# Patient Record
Sex: MALE | NOT HISPANIC OR LATINO | Employment: FULL TIME | URBAN - METROPOLITAN AREA
[De-identification: names, ages, dates, MRNs, and addresses within clinical notes are randomized per-mention and may not be internally consistent; named-entity substitution may affect disease eponyms.]

---

## 2023-10-31 ENCOUNTER — LAB (OUTPATIENT)
Dept: LAB | Facility: LAB | Age: 53
End: 2023-10-31
Payer: COMMERCIAL

## 2023-10-31 ENCOUNTER — OFFICE VISIT (OUTPATIENT)
Dept: PRIMARY CARE | Facility: CLINIC | Age: 53
End: 2023-10-31
Payer: COMMERCIAL

## 2023-10-31 VITALS
HEIGHT: 71 IN | WEIGHT: 207 LBS | TEMPERATURE: 98.6 F | OXYGEN SATURATION: 96 % | DIASTOLIC BLOOD PRESSURE: 89 MMHG | HEART RATE: 90 BPM | SYSTOLIC BLOOD PRESSURE: 157 MMHG | BODY MASS INDEX: 28.98 KG/M2

## 2023-10-31 DIAGNOSIS — E78.5 DYSLIPIDEMIA: ICD-10-CM

## 2023-10-31 DIAGNOSIS — Z00.00 ROUTINE GENERAL MEDICAL EXAMINATION AT A HEALTH CARE FACILITY: ICD-10-CM

## 2023-10-31 DIAGNOSIS — I10 HYPERTENSION, UNSPECIFIED TYPE: ICD-10-CM

## 2023-10-31 DIAGNOSIS — E78.5 DYSLIPIDEMIA: Primary | ICD-10-CM

## 2023-10-31 LAB
ALBUMIN SERPL BCP-MCNC: 4.4 G/DL (ref 3.4–5)
ALP SERPL-CCNC: 99 U/L (ref 33–120)
ALT SERPL W P-5'-P-CCNC: 14 U/L (ref 10–52)
ANION GAP SERPL CALC-SCNC: 15 MMOL/L (ref 10–20)
AST SERPL W P-5'-P-CCNC: 13 U/L (ref 9–39)
BASOPHILS # BLD AUTO: 0.08 X10*3/UL (ref 0–0.1)
BASOPHILS NFR BLD AUTO: 0.7 %
BILIRUB SERPL-MCNC: 0.5 MG/DL (ref 0–1.2)
BUN SERPL-MCNC: 12 MG/DL (ref 6–23)
CALCIUM SERPL-MCNC: 9.9 MG/DL (ref 8.6–10.6)
CHLORIDE SERPL-SCNC: 102 MMOL/L (ref 98–107)
CHOLEST SERPL-MCNC: 174 MG/DL (ref 0–199)
CHOLESTEROL/HDL RATIO: 3.5
CO2 SERPL-SCNC: 26 MMOL/L (ref 21–32)
CREAT SERPL-MCNC: 1.02 MG/DL (ref 0.5–1.3)
EOSINOPHIL # BLD AUTO: 0.34 X10*3/UL (ref 0–0.7)
EOSINOPHIL NFR BLD AUTO: 3 %
ERYTHROCYTE [DISTWIDTH] IN BLOOD BY AUTOMATED COUNT: 21.3 % (ref 11.5–14.5)
GFR SERPL CREATININE-BSD FRML MDRD: 88 ML/MIN/1.73M*2
GLUCOSE SERPL-MCNC: 98 MG/DL (ref 74–99)
HCT VFR BLD AUTO: 29.5 % (ref 41–52)
HDLC SERPL-MCNC: 49.9 MG/DL
HGB BLD-MCNC: 6.9 G/DL (ref 13.5–17.5)
HYPOCHROMIA BLD QL SMEAR: NORMAL
IMM GRANULOCYTES # BLD AUTO: 0.03 X10*3/UL (ref 0–0.7)
IMM GRANULOCYTES NFR BLD AUTO: 0.3 % (ref 0–0.9)
LDLC SERPL CALC-MCNC: 94 MG/DL
LYMPHOCYTES # BLD AUTO: 2.28 X10*3/UL (ref 1.2–4.8)
LYMPHOCYTES NFR BLD AUTO: 20.1 %
MCH RBC QN AUTO: 13.6 PG (ref 26–34)
MCHC RBC AUTO-ENTMCNC: 23.4 G/DL (ref 32–36)
MCV RBC AUTO: 58 FL (ref 80–100)
MONOCYTES # BLD AUTO: 1.01 X10*3/UL (ref 0.1–1)
MONOCYTES NFR BLD AUTO: 8.9 %
NEUTROPHILS # BLD AUTO: 7.59 X10*3/UL (ref 1.2–7.7)
NEUTROPHILS NFR BLD AUTO: 67 %
NON HDL CHOLESTEROL: 124 MG/DL (ref 0–149)
NRBC BLD-RTO: 0 /100 WBCS (ref 0–0)
OVALOCYTES BLD QL SMEAR: NORMAL
PLATELET # BLD AUTO: 592 X10*3/UL (ref 150–450)
PMV BLD AUTO: 9.9 FL (ref 7.5–11.5)
POLYCHROMASIA BLD QL SMEAR: NORMAL
POTASSIUM SERPL-SCNC: 4.6 MMOL/L (ref 3.5–5.3)
PROT SERPL-MCNC: 7.7 G/DL (ref 6.4–8.2)
PSA SERPL-MCNC: 1.09 NG/ML
RBC # BLD AUTO: 5.07 X10*6/UL (ref 4.5–5.9)
RBC MORPH BLD: NORMAL
SCHISTOCYTES BLD QL SMEAR: NORMAL
SODIUM SERPL-SCNC: 138 MMOL/L (ref 136–145)
TRIGL SERPL-MCNC: 150 MG/DL (ref 0–149)
VLDL: 30 MG/DL (ref 0–40)
WBC # BLD AUTO: 11.3 X10*3/UL (ref 4.4–11.3)

## 2023-10-31 PROCEDURE — 80061 LIPID PANEL: CPT

## 2023-10-31 PROCEDURE — 99203 OFFICE O/P NEW LOW 30 MIN: CPT | Performed by: INTERNAL MEDICINE

## 2023-10-31 PROCEDURE — 80053 COMPREHEN METABOLIC PANEL: CPT

## 2023-10-31 PROCEDURE — 36415 COLL VENOUS BLD VENIPUNCTURE: CPT

## 2023-10-31 PROCEDURE — 3079F DIAST BP 80-89 MM HG: CPT | Performed by: INTERNAL MEDICINE

## 2023-10-31 PROCEDURE — 85025 COMPLETE CBC W/AUTO DIFF WBC: CPT

## 2023-10-31 PROCEDURE — 84153 ASSAY OF PSA TOTAL: CPT

## 2023-10-31 PROCEDURE — 1036F TOBACCO NON-USER: CPT | Performed by: INTERNAL MEDICINE

## 2023-10-31 PROCEDURE — 93000 ELECTROCARDIOGRAM COMPLETE: CPT | Performed by: INTERNAL MEDICINE

## 2023-10-31 PROCEDURE — 3077F SYST BP >= 140 MM HG: CPT | Performed by: INTERNAL MEDICINE

## 2023-10-31 RX ORDER — AMLODIPINE BESYLATE 10 MG/1
10 TABLET ORAL DAILY
Qty: 90 TABLET | Refills: 1 | Status: SHIPPED | OUTPATIENT
Start: 2023-10-31 | End: 2024-04-29 | Stop reason: SDUPTHER

## 2023-10-31 RX ORDER — ATORVASTATIN CALCIUM 20 MG/1
20 TABLET, FILM COATED ORAL DAILY
COMMUNITY
End: 2023-10-31 | Stop reason: SDUPTHER

## 2023-10-31 RX ORDER — ATORVASTATIN CALCIUM 20 MG/1
20 TABLET, FILM COATED ORAL DAILY
Qty: 90 TABLET | Refills: 1 | Status: SHIPPED | OUTPATIENT
Start: 2023-10-31 | End: 2024-10-30

## 2023-10-31 RX ORDER — TRIAMTERENE AND HYDROCHLOROTHIAZIDE 75; 50 MG/1; MG/1
1 TABLET ORAL DAILY
COMMUNITY
End: 2023-10-31 | Stop reason: SDUPTHER

## 2023-10-31 RX ORDER — AMLODIPINE BESYLATE 10 MG/1
10 TABLET ORAL DAILY
COMMUNITY
End: 2023-10-31 | Stop reason: SDUPTHER

## 2023-10-31 RX ORDER — TRIAMTERENE AND HYDROCHLOROTHIAZIDE 75; 50 MG/1; MG/1
1 TABLET ORAL DAILY
Qty: 90 TABLET | Refills: 1 | Status: SHIPPED | OUTPATIENT
Start: 2023-10-31 | End: 2024-01-18 | Stop reason: WASHOUT

## 2023-10-31 ASSESSMENT — LIFESTYLE VARIABLES
HOW OFTEN DO YOU HAVE A DRINK CONTAINING ALCOHOL: MONTHLY OR LESS
AUDIT-C TOTAL SCORE: 2
HOW MANY STANDARD DRINKS CONTAINING ALCOHOL DO YOU HAVE ON A TYPICAL DAY: 1 OR 2
HOW OFTEN DO YOU HAVE SIX OR MORE DRINKS ON ONE OCCASION: LESS THAN MONTHLY
SKIP TO QUESTIONS 9-10: 0

## 2023-10-31 ASSESSMENT — PATIENT HEALTH QUESTIONNAIRE - PHQ9
SUM OF ALL RESPONSES TO PHQ9 QUESTIONS 1 AND 2: 0
2. FEELING DOWN, DEPRESSED OR HOPELESS: NOT AT ALL
1. LITTLE INTEREST OR PLEASURE IN DOING THINGS: NOT AT ALL

## 2023-10-31 ASSESSMENT — COLUMBIA-SUICIDE SEVERITY RATING SCALE - C-SSRS: 1. IN THE PAST MONTH, HAVE YOU WISHED YOU WERE DEAD OR WISHED YOU COULD GO TO SLEEP AND NOT WAKE UP?: NO

## 2023-10-31 NOTE — PROGRESS NOTES
"Subjective   Patient ID: Eze Damian is a 52 y.o. male who presents for New Patient Visit.    HPI     Review of Systems    Objective   /89   Pulse 90   Temp 37 °C (98.6 °F)   Ht 1.803 m (5' 11\")   Wt 93.9 kg (207 lb)   SpO2 96%   BMI 28.87 kg/m²     Physical Exam    Assessment/Plan          "

## 2023-10-31 NOTE — PROGRESS NOTES
"Subjective   Patient ID: Eze Damian is a 52 y.o. male who presents for New Patient Visit.    HPI patient presents to clinic in order  to get established with the office.  He had been a former patient of Adeola DUKE CNP.  He is noticed to have elevated blood pressure of 157/89 because he ran out of his medication few months ago.  He is requesting a routine physical examination.  He is otherwise doing well and denies any cough, congestion, fever or chills shortness of breath and swelling of legs.  EKG done shows sinus arrhythmia at 93 bpm with possible left atrial enlargement without any ischemic changes.   Past medical history significant for hypertension, dyslipidemia and coronary atherosclerosis with a coronary artery calcium score of 91.39 done on 11/1/2021.    Review of Systems   Constitutional: Negative.    HENT: Negative.     Eyes: Negative.    Respiratory: Negative.     Cardiovascular: Negative.    Gastrointestinal: Negative.    Endocrine: Negative.    Genitourinary: Negative.    Musculoskeletal: Negative.    Skin: Negative.    Allergic/Immunologic: Negative.    Neurological: Negative.    Hematological: Negative.    Psychiatric/Behavioral: Negative.         Objective   /89   Pulse 90   Temp 37 °C (98.6 °F)   Ht 1.803 m (5' 11\")   Wt 93.9 kg (207 lb)   SpO2 96%   BMI 28.87 kg/m²     Physical Exam  Constitutional:       Appearance: Normal appearance. He is normal weight.   HENT:      Right Ear: Tympanic membrane normal.      Left Ear: Tympanic membrane and ear canal normal.      Nose: Nose normal.   Neck:      Vascular: No carotid bruit.   Cardiovascular:      Rate and Rhythm: Normal rate.   Pulmonary:      Effort: No respiratory distress.      Breath sounds: No stridor. No wheezing.   Abdominal:      Palpations: Abdomen is soft.      Tenderness: There is no abdominal tenderness. There is no guarding or rebound.   Skin:     Coloration: Skin is not jaundiced.   Neurological:      General: No " focal deficit present.      Mental Status: He is alert and oriented to person, place, and time.   Psychiatric:         Mood and Affect: Mood normal.         Assessment/Plan    patient is given a refill on his routine medication.  He will be scheduled for routine blood work.  He is also referred to gastroenterology clinic for colonoscopy regarding colon cancer screening.  He will return to clinic in 2 weeks for follow-up visit.

## 2023-11-01 ENCOUNTER — TELEPHONE (OUTPATIENT)
Dept: PRIMARY CARE | Facility: CLINIC | Age: 53
End: 2023-11-01
Payer: COMMERCIAL

## 2023-11-02 ENCOUNTER — OFFICE VISIT (OUTPATIENT)
Dept: PRIMARY CARE | Facility: CLINIC | Age: 53
End: 2023-11-02
Payer: COMMERCIAL

## 2023-11-02 VITALS
SYSTOLIC BLOOD PRESSURE: 158 MMHG | OXYGEN SATURATION: 98 % | DIASTOLIC BLOOD PRESSURE: 80 MMHG | HEIGHT: 71 IN | HEART RATE: 120 BPM | BODY MASS INDEX: 28.98 KG/M2 | WEIGHT: 207 LBS

## 2023-11-02 DIAGNOSIS — I10 HYPERTENSION, UNSPECIFIED TYPE: ICD-10-CM

## 2023-11-02 DIAGNOSIS — D50.9 MICROCYTIC ANEMIA: Primary | ICD-10-CM

## 2023-11-02 PROCEDURE — 1036F TOBACCO NON-USER: CPT | Performed by: INTERNAL MEDICINE

## 2023-11-02 PROCEDURE — 3077F SYST BP >= 140 MM HG: CPT | Performed by: INTERNAL MEDICINE

## 2023-11-02 PROCEDURE — 3079F DIAST BP 80-89 MM HG: CPT | Performed by: INTERNAL MEDICINE

## 2023-11-02 PROCEDURE — 99213 OFFICE O/P EST LOW 20 MIN: CPT | Performed by: INTERNAL MEDICINE

## 2023-11-02 RX ORDER — IRON POLYSACCHARIDE COMPLEX 150 MG
150 CAPSULE ORAL DAILY
Qty: 30 CAPSULE | Refills: 0 | Status: SHIPPED | OUTPATIENT
Start: 2023-11-02 | End: 2023-12-02

## 2023-11-02 RX ORDER — PANTOPRAZOLE SODIUM 40 MG/1
40 TABLET, DELAYED RELEASE ORAL DAILY
Qty: 30 TABLET | Refills: 0 | Status: SHIPPED | OUTPATIENT
Start: 2023-11-02 | End: 2023-12-04 | Stop reason: SDUPTHER

## 2023-11-02 ASSESSMENT — PATIENT HEALTH QUESTIONNAIRE - PHQ9
1. LITTLE INTEREST OR PLEASURE IN DOING THINGS: NOT AT ALL
2. FEELING DOWN, DEPRESSED OR HOPELESS: NOT AT ALL
SUM OF ALL RESPONSES TO PHQ9 QUESTIONS 1 AND 2: 0

## 2023-11-02 ASSESSMENT — COLUMBIA-SUICIDE SEVERITY RATING SCALE - C-SSRS: 1. IN THE PAST MONTH, HAVE YOU WISHED YOU WERE DEAD OR WISHED YOU COULD GO TO SLEEP AND NOT WAKE UP?: NO

## 2023-11-02 NOTE — PROGRESS NOTES
"Subjective   Patient ID: Eze Damian is a 52 y.o. male who presents for Follow-up.    HPI     Review of Systems    Objective   /80   Pulse (!) 120   Ht 1.803 m (5' 11\")   Wt 93.9 kg (207 lb)   SpO2 98%   BMI 28.87 kg/m²     Physical Exam    Assessment/Plan          "

## 2023-11-02 NOTE — PROGRESS NOTES
"Subjective   Patient ID: Eze Damian is a 52 y.o. male who presents for Follow-up.    HPI patient was requested to come to the clinic to discuss abnormal test results.  He is doing well and denies any abdominal pain, nausea vomiting, GI bleeding, jaundice, swelling of legs, fever, weight loss and hematuria.  Laboratory studies done shows hemoglobin of 6.9, hematocrit of 29.5 MCV of 58 MCH 13.6 MCHC of 23.4 with platelets of 592, serum glucose of 98 and other studies have been reviewed and discussed with him.  He has history of hypertension, dyslipidemia and coronary atherosclerosis with a coronary artery calcium score of 91.39 done on 11/1/2021.     Review of Systems   Constitutional: Negative.    HENT: Negative.     Eyes: Negative.    Respiratory: Negative.     Cardiovascular: Negative.    Gastrointestinal: Negative.    Endocrine: Negative.    Genitourinary: Negative.    Musculoskeletal: Negative.    Skin: Negative.    Allergic/Immunologic: Negative.    Neurological: Negative.    Hematological: Negative.    Psychiatric/Behavioral: Negative.         Objective   /80   Pulse (!) 120   Ht 1.803 m (5' 11\")   Wt 93.9 kg (207 lb)   SpO2 98%   BMI 28.87 kg/m²     Physical Exam  Constitutional:       Appearance: Normal appearance. He is normal weight.   HENT:      Right Ear: Tympanic membrane normal.      Left Ear: Tympanic membrane and ear canal normal.      Nose: Nose normal.   Neck:      Vascular: No carotid bruit.   Cardiovascular:      Rate and Rhythm: Normal rate.   Pulmonary:      Effort: No respiratory distress.      Breath sounds: No stridor. No wheezing.   Abdominal:      Palpations: Abdomen is soft.      Tenderness: There is no guarding or rebound.   Skin:     Coloration: Skin is not jaundiced.   Neurological:      General: No focal deficit present.      Mental Status: He is alert and oriented to person, place, and time.   Psychiatric:         Mood and Affect: Mood normal.         Assessment/Plan    " patient advised to avoid any NSAIDs or any alcohol use.  He is given trial with iron tablet and pantoprazole.  I will reach out to Dr. Fischer  personally and try to get him an earlier appointment for EGD and colonoscopy in view of severe anemia.  He will continue other medications and will return to clinic in 1 month for follow-up visit.

## 2023-11-03 ASSESSMENT — ENCOUNTER SYMPTOMS
HEMATOLOGIC/LYMPHATIC NEGATIVE: 1
CONSTITUTIONAL NEGATIVE: 1
PSYCHIATRIC NEGATIVE: 1
MUSCULOSKELETAL NEGATIVE: 1
GASTROINTESTINAL NEGATIVE: 1
RESPIRATORY NEGATIVE: 1
CARDIOVASCULAR NEGATIVE: 1
ENDOCRINE NEGATIVE: 1
NEUROLOGICAL NEGATIVE: 1
EYES NEGATIVE: 1
ALLERGIC/IMMUNOLOGIC NEGATIVE: 1

## 2023-11-05 ASSESSMENT — ENCOUNTER SYMPTOMS
PSYCHIATRIC NEGATIVE: 1
RESPIRATORY NEGATIVE: 1
GASTROINTESTINAL NEGATIVE: 1
NEUROLOGICAL NEGATIVE: 1
HEMATOLOGIC/LYMPHATIC NEGATIVE: 1
EYES NEGATIVE: 1
MUSCULOSKELETAL NEGATIVE: 1
CARDIOVASCULAR NEGATIVE: 1
ALLERGIC/IMMUNOLOGIC NEGATIVE: 1
CONSTITUTIONAL NEGATIVE: 1
ENDOCRINE NEGATIVE: 1

## 2023-11-14 DIAGNOSIS — D50.9 IRON DEFICIENCY ANEMIA, UNSPECIFIED IRON DEFICIENCY ANEMIA TYPE: ICD-10-CM

## 2023-11-14 RX ORDER — POLYETHYLENE GLYCOL 3350, SODIUM SULFATE ANHYDROUS, SODIUM BICARBONATE, SODIUM CHLORIDE, POTASSIUM CHLORIDE 236; 22.74; 6.74; 5.86; 2.97 G/4L; G/4L; G/4L; G/4L; G/4L
4000 POWDER, FOR SOLUTION ORAL ONCE
Qty: 4000 ML | Refills: 0 | Status: SHIPPED | OUTPATIENT
Start: 2023-11-14 | End: 2023-11-14

## 2023-11-17 ENCOUNTER — LAB (OUTPATIENT)
Dept: LAB | Facility: LAB | Age: 53
End: 2023-11-17
Payer: COMMERCIAL

## 2023-11-17 DIAGNOSIS — D50.9 MICROCYTIC ANEMIA: ICD-10-CM

## 2023-11-17 LAB
BASOPHILS # BLD AUTO: 0.08 X10*3/UL (ref 0–0.1)
BASOPHILS NFR BLD AUTO: 0.6 %
EOSINOPHIL # BLD AUTO: 0.32 X10*3/UL (ref 0–0.7)
EOSINOPHIL NFR BLD AUTO: 2.4 %
ERYTHROCYTE [DISTWIDTH] IN BLOOD BY AUTOMATED COUNT: 22 % (ref 11.5–14.5)
FERRITIN SERPL-MCNC: 15 NG/ML (ref 20–300)
HCT VFR BLD AUTO: 31.5 % (ref 41–52)
HGB BLD-MCNC: 7.6 G/DL (ref 13.5–17.5)
HGB RETIC QN: 14 PG (ref 28–38)
HYPOCHROMIA BLD QL SMEAR: NORMAL
IMM GRANULOCYTES # BLD AUTO: 0.05 X10*3/UL (ref 0–0.7)
IMM GRANULOCYTES NFR BLD AUTO: 0.4 % (ref 0–0.9)
IMMATURE RETIC FRACTION: 29.4 %
LYMPHOCYTES # BLD AUTO: 1.94 X10*3/UL (ref 1.2–4.8)
LYMPHOCYTES NFR BLD AUTO: 14.6 %
MCH RBC QN AUTO: 14.4 PG (ref 26–34)
MCHC RBC AUTO-ENTMCNC: 24.1 G/DL (ref 32–36)
MCV RBC AUTO: 60 FL (ref 80–100)
MONOCYTES # BLD AUTO: 1.14 X10*3/UL (ref 0.1–1)
MONOCYTES NFR BLD AUTO: 8.6 %
NEUTROPHILS # BLD AUTO: 9.74 X10*3/UL (ref 1.2–7.7)
NEUTROPHILS NFR BLD AUTO: 73.4 %
NRBC BLD-RTO: 0 /100 WBCS (ref 0–0)
OVALOCYTES BLD QL SMEAR: NORMAL
PLATELET # BLD AUTO: 1005 X10*3/UL (ref 150–450)
RBC # BLD AUTO: 5.28 X10*6/UL (ref 4.5–5.9)
RBC MORPH BLD: NORMAL
RETICS #: 0.11 X10*6/UL (ref 0.02–0.12)
RETICS/RBC NFR AUTO: 2.1 % (ref 0.5–2)
WBC # BLD AUTO: 13.3 X10*3/UL (ref 4.4–11.3)

## 2023-11-17 PROCEDURE — 85025 COMPLETE CBC W/AUTO DIFF WBC: CPT

## 2023-11-17 PROCEDURE — 82728 ASSAY OF FERRITIN: CPT

## 2023-11-17 PROCEDURE — 85045 AUTOMATED RETICULOCYTE COUNT: CPT

## 2023-11-17 PROCEDURE — 36415 COLL VENOUS BLD VENIPUNCTURE: CPT

## 2023-11-20 ENCOUNTER — APPOINTMENT (OUTPATIENT)
Dept: PRIMARY CARE | Facility: CLINIC | Age: 53
End: 2023-11-20
Payer: COMMERCIAL

## 2023-11-20 ENCOUNTER — TELEMEDICINE (OUTPATIENT)
Dept: PRIMARY CARE | Facility: CLINIC | Age: 53
End: 2023-11-20
Payer: COMMERCIAL

## 2023-11-20 ENCOUNTER — TELEPHONE (OUTPATIENT)
Dept: PRIMARY CARE | Facility: CLINIC | Age: 53
End: 2023-11-20

## 2023-11-20 DIAGNOSIS — D75.839 THROMBOCYTOSIS: Primary | ICD-10-CM

## 2023-11-20 DIAGNOSIS — D50.9 MICROCYTIC ANEMIA: ICD-10-CM

## 2023-11-20 PROCEDURE — 99442 PR PHYS/QHP TELEPHONE EVALUATION 11-20 MIN: CPT | Performed by: INTERNAL MEDICINE

## 2023-11-20 NOTE — PROGRESS NOTES
Subjective   Patient ID: Eze Damian is a 53 y.o. male who presents for Follow-up.    HPI patient is attended by phone visit to discuss abnormal test result.  CBC done shows hemoglobin of 7.6 MCV of 60 and platelet count of more than 1 million.  He is otherwise asymptomatic.  He denies any swelling of the limb, nausea, vomiting, abdominal pain and any GI bleeding.  He is still awaiting to have colonoscopy done. He has history of hypertension, dyslipidemia and coronary atherosclerosis with a coronary artery calcium score of 91.39 done on 11/1/2021.      Review of Systems   Constitutional: Negative.    HENT: Negative.     Eyes: Negative.    Respiratory: Negative.     Cardiovascular: Negative.    Gastrointestinal: Negative.    Endocrine: Negative.    Genitourinary: Negative.    Musculoskeletal: Negative.    Skin: Negative.    Allergic/Immunologic: Negative.    Neurological: Negative.    Hematological: Negative.    Psychiatric/Behavioral: Negative.         Objective   There were no vitals taken for this visit.    Physical Exam not done     Assessment/Plan    patient advised to stop taking iron tablet.  He will be referred to hematology oncology regarding microcytic anemia and thrombocytosis.  He will also follow-up with gastroenterology regarding upper and lower endoscopy.  He will return to clinic in 2 months for follow-up visit.

## 2023-11-22 ASSESSMENT — ENCOUNTER SYMPTOMS
MUSCULOSKELETAL NEGATIVE: 1
RESPIRATORY NEGATIVE: 1
EYES NEGATIVE: 1
ENDOCRINE NEGATIVE: 1
ALLERGIC/IMMUNOLOGIC NEGATIVE: 1
HEMATOLOGIC/LYMPHATIC NEGATIVE: 1
NEUROLOGICAL NEGATIVE: 1
CARDIOVASCULAR NEGATIVE: 1
PSYCHIATRIC NEGATIVE: 1
CONSTITUTIONAL NEGATIVE: 1
GASTROINTESTINAL NEGATIVE: 1

## 2023-12-01 ENCOUNTER — APPOINTMENT (OUTPATIENT)
Dept: GASTROENTEROLOGY | Facility: EXTERNAL LOCATION | Age: 53
End: 2023-12-01
Payer: COMMERCIAL

## 2023-12-04 DIAGNOSIS — D50.9 MICROCYTIC ANEMIA: ICD-10-CM

## 2023-12-05 RX ORDER — PANTOPRAZOLE SODIUM 40 MG/1
40 TABLET, DELAYED RELEASE ORAL DAILY
Qty: 30 TABLET | Refills: 0 | Status: SHIPPED | OUTPATIENT
Start: 2023-12-05 | End: 2024-01-08 | Stop reason: SDUPTHER

## 2023-12-09 ENCOUNTER — OFFICE VISIT (OUTPATIENT)
Dept: GASTROENTEROLOGY | Facility: EXTERNAL LOCATION | Age: 53
End: 2023-12-09
Payer: COMMERCIAL

## 2023-12-09 DIAGNOSIS — K63.89 COLONIC MASS: Primary | ICD-10-CM

## 2023-12-09 DIAGNOSIS — K64.8 OTHER HEMORRHOIDS: ICD-10-CM

## 2023-12-09 DIAGNOSIS — D50.9 IRON DEFICIENCY ANEMIA, UNSPECIFIED IRON DEFICIENCY ANEMIA TYPE: ICD-10-CM

## 2023-12-09 DIAGNOSIS — D49.0 NEOPLASM OF UNSPECIFIED BEHAVIOR OF DIGESTIVE SYSTEM: ICD-10-CM

## 2023-12-09 DIAGNOSIS — D12.3 BENIGN NEOPLASM OF TRANSVERSE COLON: ICD-10-CM

## 2023-12-09 DIAGNOSIS — D50.9 IRON DEFICIENCY ANEMIA, UNSPECIFIED: ICD-10-CM

## 2023-12-09 DIAGNOSIS — Z12.11 ENCOUNTER FOR SCREENING FOR MALIGNANT NEOPLASM OF COLON: ICD-10-CM

## 2023-12-09 DIAGNOSIS — D64.9 ANEMIA, UNSPECIFIED TYPE: ICD-10-CM

## 2023-12-09 PROCEDURE — 88342 IMHCHEM/IMCYTCHM 1ST ANTB: CPT | Performed by: PATHOLOGY

## 2023-12-09 PROCEDURE — 88341 IMHCHEM/IMCYTCHM EA ADD ANTB: CPT

## 2023-12-09 PROCEDURE — 45381 COLONOSCOPY SUBMUCOUS NJX: CPT | Performed by: INTERNAL MEDICINE

## 2023-12-09 PROCEDURE — 45385 COLONOSCOPY W/LESION REMOVAL: CPT | Performed by: INTERNAL MEDICINE

## 2023-12-09 PROCEDURE — 88305 TISSUE EXAM BY PATHOLOGIST: CPT

## 2023-12-09 PROCEDURE — 1036F TOBACCO NON-USER: CPT | Performed by: INTERNAL MEDICINE

## 2023-12-09 PROCEDURE — 88341 IMHCHEM/IMCYTCHM EA ADD ANTB: CPT | Performed by: PATHOLOGY

## 2023-12-09 PROCEDURE — 88342 IMHCHEM/IMCYTCHM 1ST ANTB: CPT

## 2023-12-09 PROCEDURE — 45380 COLONOSCOPY AND BIOPSY: CPT | Performed by: INTERNAL MEDICINE

## 2023-12-09 PROCEDURE — 88305 TISSUE EXAM BY PATHOLOGIST: CPT | Performed by: PATHOLOGY

## 2023-12-09 PROCEDURE — 43239 EGD BIOPSY SINGLE/MULTIPLE: CPT | Performed by: INTERNAL MEDICINE

## 2023-12-13 ENCOUNTER — LAB REQUISITION (OUTPATIENT)
Dept: LAB | Facility: HOSPITAL | Age: 53
End: 2023-12-13
Payer: COMMERCIAL

## 2023-12-15 PROBLEM — E78.5 HYPERLIPIDEMIA: Status: ACTIVE | Noted: 2023-12-15

## 2023-12-15 PROBLEM — U07.1 COVID-19 VIRUS DETECTED: Status: ACTIVE | Noted: 2023-12-15

## 2023-12-15 PROBLEM — I10 HIGH BLOOD PRESSURE: Status: ACTIVE | Noted: 2023-12-15

## 2023-12-15 PROBLEM — M25.731 CARPAL BOSS OF RIGHT WRIST: Status: ACTIVE | Noted: 2023-12-15

## 2023-12-15 PROBLEM — R22.31 MASS OF RIGHT WRIST: Status: ACTIVE | Noted: 2023-12-15

## 2023-12-15 PROBLEM — K21.9 GASTROESOPHAGEAL REFLUX DISEASE: Status: ACTIVE | Noted: 2023-12-15

## 2023-12-15 RX ORDER — POLYETHYLENE GLYCOL-3350 AND ELECTROLYTES 236; 6.74; 5.86; 2.97; 22.74 G/274.31G; G/274.31G; G/274.31G; G/274.31G; G/274.31G
POWDER, FOR SOLUTION ORAL
Status: ON HOLD | COMMUNITY
Start: 2023-11-14 | End: 2024-01-29 | Stop reason: ALTCHOICE

## 2023-12-18 NOTE — H&P (VIEW-ONLY)
History Of Present Illness  Eze Damian is a 53 y.o. male referred by Dr. Fredi Russo for evaluation of colon cancer.    He was seen by new PCP in Mid-November. He was found to be anemic. HGB 7.6 g/dL. He was started on iron complex and scheduled for colonoscopy.  1st colonoscopy.    He started  feeling fatigued in early November. No unintentional weight loss. Denies any rectal bleeding.  No abdominal pain or changed in bowel habits.  Occasional right sided back pain     Has a BM every other day. Denies diarrhea/constipation.    His Hb is 6.3    12/09/2023  Colonoscopy to TI  The perianal and digital rectal exams were normal.    The terminal ileum appeared normal.  An infiltrative, polypoid and ulcerated non-obstructing large mass was found at the hepatic flexure.  The mass was partially circumferential (involving one third of the lumen circumference).  Biopsies were taken with cold forceps for histology.  Areas of a few folds proximal to the mass and the associated proximal large polyp and a few folds distal to the mass and the associated distal large polyp were tattooed with an injection of 3 ml of Spot (carbon black) total.  Two semi-pedunculated polyps were found in the hepatic flexure.  The polyps were large in size.  One of these polyps was just proximal to the mass and the other polyp was just distal to the mass.  No resection was attempted given the location in close proximity to the mass that would involve the same surgical field.  Three sessile polyps were found in the transverse colon.  The polyps were  5 to 9 mm in size. These polyps were removed with a cold snare.  Resection and retrieval were complete.    External and internal hemorrhoids were found during retroflexion. The hemorrhoids were small.  The exam was otherwise without abnormality on direct and retroflexion views.    12/09/2023 EGD  The examined esophagus was normal.  The Z line was regular and was found 41 cm from the incisors.  The  Utilizing patient's CPAP mask and tubing entire examined stomach was normal.  The duodenal bulb, second portion of the duodenum and third portion of the duodenum were normal.  Biopsies for histology were taken with cold forceps for evaluation of celiac disease.     Pathology  A.  Duodenum, biopsies:  - Small intestinal mucosa, no significant histopathological abnormalities.     B.  Transverse colon, polypectomies:  - Fragments of tubular adenoma.     C.  Hepatic flexure of colon, biopsy:  - Moderately differentiated adenocarcinoma of colon.    LABORATORY:  CEA 1.7    1/11/24 CT CHEST/ABD/PELVIS WITH CONTRAST:     Past Medical History  Colon cancer  HTN  HLD    Surgical History  1992 left knee surgery     Social History  Smoking: Denies. Occas chews tobacco  ETOH: socially   No recreation drugs  Works at the [employer]    Family History  Father unknown history  PGF - CAD  Mom - COPD   MGM - asthma      Allergies  Patient has no known allergies.    Review of Systems  Constitutional: Negative for fever, chills, anorexia, weight loss, malaise     ENMT: Negative for nasal discharge, congestion, ear pain, mouth pain, throat pain     Respiratory: Negative for cough, hemoptysis, wheezing, shortness of breath     Cardiac: Negative for chest pain, dyspnea on exertion, orthopnea, palpitations, syncope, (+)HTN, (+)HLD     Gastrointestinal: Negative for nausea, vomiting, diarrhea, constipation, abdominal pain, (+)COLON CANCER    Genitourinary: Negative for discharge, dysuria, flank pain, frequency, hematuria     Musculoskeletal: Negative for decreased ROM, pain, swelling, weakness     Neurological: Negative for dizziness, confusion, headache, seizures, syncope     Psychiatric: Negative for mood changes, anxiety, hallucinations, sleep changes, suicidal ideas     Skin: Negative for mass, pain, itching, rash, ulcer     Endocrine: Negative for heat intolerance, cold intolerance, excessive sweating, polyuria, excess thirst    Hematologic/Lymph: Negative for bruising, easy  bleeding, night sweats, petechiae, history of DVT/PE or cancer. +Anemia    Allergic/Immunologic: Negative for anaphylaxis, itchy/ teary eyes, itching, sneezing, swelling     Physical Exam:  Constitutional:       Appearance: Normal appearance. He is normal height.   Neck:      Vascular: No carotid bruit.   Cardiovascular:      Rate and Rhythm: Normal rate.   Pulmonary:      Effort: No respiratory distress.      Breath sounds: No stridor. No wheezing.   Abdominal:      Palpations: Abdomen is soft.      Tenderness: There is no guarding or rebound.   Skin:     Coloration: Skin is not jaundiced.   Neurological:      General: No focal deficit present.      Mental Status: He is alert and oriented to person, place, and time.   Psychiatric:         Mood and Affect: Mood normal.     Impression: Pt with hepatic flexure cancer   Severe anemia 6.3 last check     Plan:   Awaiting CT scans tomorrow  Repeat labs  Restart iron therapy  Check anemia labs - resume iron.  If <6 will send to ED For blood   If > 6 discussed iron containing foods and the importance of iron.   IF CT's negative then plan for right/extended right  based on tattoo location.     Discussed increased risk screening for 1st degree relatives

## 2023-12-18 NOTE — PROGRESS NOTES
History Of Present Illness  Eze Damian is a 53 y.o. male referred by Dr. Fredi Russo for evaluation of colon cancer.    He was seen by new PCP in Mid-November. He was found to be anemic. HGB 7.6 g/dL. He was started on iron complex and scheduled for colonoscopy.  1st colonoscopy.    He started  feeling fatigued in early November. No unintentional weight loss. Denies any rectal bleeding.  No abdominal pain or changed in bowel habits.  Occasional right sided back pain     Has a BM every other day. Denies diarrhea/constipation.    His Hb is 6.3    12/09/2023  Colonoscopy to TI  The perianal and digital rectal exams were normal.    The terminal ileum appeared normal.  An infiltrative, polypoid and ulcerated non-obstructing large mass was found at the hepatic flexure.  The mass was partially circumferential (involving one third of the lumen circumference).  Biopsies were taken with cold forceps for histology.  Areas of a few folds proximal to the mass and the associated proximal large polyp and a few folds distal to the mass and the associated distal large polyp were tattooed with an injection of 3 ml of Spot (carbon black) total.  Two semi-pedunculated polyps were found in the hepatic flexure.  The polyps were large in size.  One of these polyps was just proximal to the mass and the other polyp was just distal to the mass.  No resection was attempted given the location in close proximity to the mass that would involve the same surgical field.  Three sessile polyps were found in the transverse colon.  The polyps were  5 to 9 mm in size. These polyps were removed with a cold snare.  Resection and retrieval were complete.    External and internal hemorrhoids were found during retroflexion. The hemorrhoids were small.  The exam was otherwise without abnormality on direct and retroflexion views.    12/09/2023 EGD  The examined esophagus was normal.  The Z line was regular and was found 41 cm from the incisors.  The  entire examined stomach was normal.  The duodenal bulb, second portion of the duodenum and third portion of the duodenum were normal.  Biopsies for histology were taken with cold forceps for evaluation of celiac disease.     Pathology  A.  Duodenum, biopsies:  - Small intestinal mucosa, no significant histopathological abnormalities.     B.  Transverse colon, polypectomies:  - Fragments of tubular adenoma.     C.  Hepatic flexure of colon, biopsy:  - Moderately differentiated adenocarcinoma of colon.    LABORATORY:  CEA 1.7    1/11/24 CT CHEST/ABD/PELVIS WITH CONTRAST:     Past Medical History  Colon cancer  HTN  HLD    Surgical History  1992 left knee surgery     Social History  Smoking: Denies. Occas chews tobacco  ETOH: socially   No recreation drugs  Works at the [employer]    Family History  Father unknown history  PGF - CAD  Mom - COPD   MGM - asthma      Allergies  Patient has no known allergies.    Review of Systems  Constitutional: Negative for fever, chills, anorexia, weight loss, malaise     ENMT: Negative for nasal discharge, congestion, ear pain, mouth pain, throat pain     Respiratory: Negative for cough, hemoptysis, wheezing, shortness of breath     Cardiac: Negative for chest pain, dyspnea on exertion, orthopnea, palpitations, syncope, (+)HTN, (+)HLD     Gastrointestinal: Negative for nausea, vomiting, diarrhea, constipation, abdominal pain, (+)COLON CANCER    Genitourinary: Negative for discharge, dysuria, flank pain, frequency, hematuria     Musculoskeletal: Negative for decreased ROM, pain, swelling, weakness     Neurological: Negative for dizziness, confusion, headache, seizures, syncope     Psychiatric: Negative for mood changes, anxiety, hallucinations, sleep changes, suicidal ideas     Skin: Negative for mass, pain, itching, rash, ulcer     Endocrine: Negative for heat intolerance, cold intolerance, excessive sweating, polyuria, excess thirst    Hematologic/Lymph: Negative for bruising, easy  bleeding, night sweats, petechiae, history of DVT/PE or cancer. +Anemia    Allergic/Immunologic: Negative for anaphylaxis, itchy/ teary eyes, itching, sneezing, swelling     Physical Exam:  Constitutional:       Appearance: Normal appearance. He is normal height.   Neck:      Vascular: No carotid bruit.   Cardiovascular:      Rate and Rhythm: Normal rate.   Pulmonary:      Effort: No respiratory distress.      Breath sounds: No stridor. No wheezing.   Abdominal:      Palpations: Abdomen is soft.      Tenderness: There is no guarding or rebound.   Skin:     Coloration: Skin is not jaundiced.   Neurological:      General: No focal deficit present.      Mental Status: He is alert and oriented to person, place, and time.   Psychiatric:         Mood and Affect: Mood normal.     Impression: Pt with hepatic flexure cancer   Severe anemia 6.3 last check     Plan:   Awaiting CT scans tomorrow  Repeat labs  Restart iron therapy  Check anemia labs - resume iron.  If <6 will send to ED For blood   If > 6 discussed iron containing foods and the importance of iron.   IF CT's negative then plan for right/extended right  based on tattoo location.     Discussed increased risk screening for 1st degree relatives

## 2023-12-21 LAB
LAB AP ASR DISCLAIMER: NORMAL
LABORATORY COMMENT REPORT: NORMAL
PATH REPORT.ADDENDUM SPEC: NORMAL
PATH REPORT.COMMENTS IMP SPEC: NORMAL
PATH REPORT.FINAL DX SPEC: NORMAL
PATH REPORT.GROSS SPEC: NORMAL
PATH REPORT.RELEVANT HX SPEC: NORMAL
PATH REPORT.TOTAL CANCER: NORMAL

## 2024-01-03 ENCOUNTER — APPOINTMENT (OUTPATIENT)
Dept: SURGERY | Facility: CLINIC | Age: 54
End: 2024-01-03
Payer: COMMERCIAL

## 2024-01-04 ENCOUNTER — APPOINTMENT (OUTPATIENT)
Dept: RADIOLOGY | Facility: CLINIC | Age: 54
End: 2024-01-04
Payer: COMMERCIAL

## 2024-01-04 ENCOUNTER — LAB (OUTPATIENT)
Dept: LAB | Facility: LAB | Age: 54
End: 2024-01-04
Payer: COMMERCIAL

## 2024-01-04 DIAGNOSIS — D50.9 MICROCYTIC ANEMIA: ICD-10-CM

## 2024-01-04 DIAGNOSIS — D75.839 THROMBOCYTOSIS: ICD-10-CM

## 2024-01-04 DIAGNOSIS — K63.89 COLONIC MASS: ICD-10-CM

## 2024-01-04 PROCEDURE — 82565 ASSAY OF CREATININE: CPT

## 2024-01-04 PROCEDURE — 36415 COLL VENOUS BLD VENIPUNCTURE: CPT

## 2024-01-04 PROCEDURE — 82378 CARCINOEMBRYONIC ANTIGEN: CPT

## 2024-01-04 PROCEDURE — 85025 COMPLETE CBC W/AUTO DIFF WBC: CPT

## 2024-01-05 LAB
BASOPHILS # BLD AUTO: 0.07 X10*3/UL (ref 0–0.1)
BASOPHILS NFR BLD AUTO: 0.5 %
CEA SERPL-MCNC: 1.7 UG/L
CREAT SERPL-MCNC: 0.89 MG/DL (ref 0.5–1.3)
EOSINOPHIL # BLD AUTO: 0.45 X10*3/UL (ref 0–0.7)
EOSINOPHIL NFR BLD AUTO: 3.4 %
ERYTHROCYTE [DISTWIDTH] IN BLOOD BY AUTOMATED COUNT: 21.9 % (ref 11.5–14.5)
GFR SERPL CREATININE-BSD FRML MDRD: >90 ML/MIN/1.73M*2
HCT VFR BLD AUTO: 26.8 % (ref 41–52)
HGB BLD-MCNC: 6.3 G/DL (ref 13.5–17.5)
HYPOCHROMIA BLD QL SMEAR: NORMAL
IMM GRANULOCYTES # BLD AUTO: 0.06 X10*3/UL (ref 0–0.7)
IMM GRANULOCYTES NFR BLD AUTO: 0.5 % (ref 0–0.9)
LYMPHOCYTES # BLD AUTO: 2.39 X10*3/UL (ref 1.2–4.8)
LYMPHOCYTES NFR BLD AUTO: 18.1 %
MCH RBC QN AUTO: 13.9 PG (ref 26–34)
MCHC RBC AUTO-ENTMCNC: 23.5 G/DL (ref 32–36)
MCV RBC AUTO: 59 FL (ref 80–100)
MONOCYTES # BLD AUTO: 1 X10*3/UL (ref 0.1–1)
MONOCYTES NFR BLD AUTO: 7.6 %
NEUTROPHILS # BLD AUTO: 9.27 X10*3/UL (ref 1.2–7.7)
NEUTROPHILS NFR BLD AUTO: 69.9 %
NRBC BLD-RTO: 0 /100 WBCS (ref 0–0)
PLATELET # BLD AUTO: 927 X10*3/UL (ref 150–450)
POLYCHROMASIA BLD QL SMEAR: NORMAL
RBC # BLD AUTO: 4.52 X10*6/UL (ref 4.5–5.9)
RBC MORPH BLD: NORMAL
TARGETS BLD QL SMEAR: NORMAL
WBC # BLD AUTO: 13.2 X10*3/UL (ref 4.4–11.3)

## 2024-01-08 DIAGNOSIS — D50.9 MICROCYTIC ANEMIA: ICD-10-CM

## 2024-01-08 RX ORDER — PANTOPRAZOLE SODIUM 40 MG/1
40 TABLET, DELAYED RELEASE ORAL DAILY
Qty: 30 TABLET | Refills: 0 | Status: ON HOLD | OUTPATIENT
Start: 2024-01-08 | End: 2024-01-29 | Stop reason: WASHOUT

## 2024-01-10 ENCOUNTER — OFFICE VISIT (OUTPATIENT)
Dept: SURGERY | Facility: CLINIC | Age: 54
End: 2024-01-10
Payer: COMMERCIAL

## 2024-01-10 VITALS
HEIGHT: 71 IN | BODY MASS INDEX: 28.56 KG/M2 | TEMPERATURE: 98.8 F | HEART RATE: 72 BPM | DIASTOLIC BLOOD PRESSURE: 71 MMHG | WEIGHT: 204 LBS | SYSTOLIC BLOOD PRESSURE: 144 MMHG

## 2024-01-10 DIAGNOSIS — C18.3 MALIGNANT NEOPLASM OF HEPATIC FLEXURE (MULTI): Primary | ICD-10-CM

## 2024-01-10 PROCEDURE — 3077F SYST BP >= 140 MM HG: CPT | Performed by: COLON & RECTAL SURGERY

## 2024-01-10 PROCEDURE — 1036F TOBACCO NON-USER: CPT | Performed by: COLON & RECTAL SURGERY

## 2024-01-10 PROCEDURE — 99214 OFFICE O/P EST MOD 30 MIN: CPT | Performed by: COLON & RECTAL SURGERY

## 2024-01-10 PROCEDURE — 3078F DIAST BP <80 MM HG: CPT | Performed by: COLON & RECTAL SURGERY

## 2024-01-10 PROCEDURE — 99204 OFFICE O/P NEW MOD 45 MIN: CPT | Performed by: COLON & RECTAL SURGERY

## 2024-01-10 RX ORDER — FERROUS SULFATE 325(65) MG
650 TABLET, DELAYED RELEASE (ENTERIC COATED) ORAL
Qty: 60 TABLET | Refills: 11 | Status: SHIPPED | OUTPATIENT
Start: 2024-01-10 | End: 2024-04-25 | Stop reason: ALTCHOICE

## 2024-01-11 ENCOUNTER — ANCILLARY PROCEDURE (OUTPATIENT)
Dept: RADIOLOGY | Facility: CLINIC | Age: 54
End: 2024-01-11
Payer: COMMERCIAL

## 2024-01-11 ENCOUNTER — LAB (OUTPATIENT)
Dept: LAB | Facility: LAB | Age: 54
End: 2024-01-11
Payer: COMMERCIAL

## 2024-01-11 DIAGNOSIS — K63.89 COLONIC MASS: ICD-10-CM

## 2024-01-11 DIAGNOSIS — K63.89 COLONIC MASS: Primary | ICD-10-CM

## 2024-01-11 DIAGNOSIS — C18.3 MALIGNANT NEOPLASM OF HEPATIC FLEXURE (MULTI): ICD-10-CM

## 2024-01-11 LAB
ABO GROUP (TYPE) IN BLOOD: NORMAL
ANTIBODY SCREEN: NORMAL
BASOPHILS # BLD AUTO: 0.08 X10*3/UL (ref 0–0.1)
BASOPHILS NFR BLD AUTO: 0.6 %
BURR CELLS BLD QL SMEAR: NORMAL
DACRYOCYTES BLD QL SMEAR: NORMAL
EOSINOPHIL # BLD AUTO: 0.25 X10*3/UL (ref 0–0.7)
EOSINOPHIL NFR BLD AUTO: 1.8 %
ERYTHROCYTE [DISTWIDTH] IN BLOOD BY AUTOMATED COUNT: 21.7 % (ref 11.5–14.5)
HCT VFR BLD AUTO: 28 % (ref 41–52)
HGB BLD-MCNC: 6.7 G/DL (ref 13.5–17.5)
HYPOCHROMIA BLD QL SMEAR: NORMAL
IMM GRANULOCYTES # BLD AUTO: 0.05 X10*3/UL (ref 0–0.7)
IMM GRANULOCYTES NFR BLD AUTO: 0.4 % (ref 0–0.9)
IRON SATN MFR SERPL: ABNORMAL %
IRON SERPL-MCNC: <20 UG/DL (ref 45–160)
LYMPHOCYTES # BLD AUTO: 2.08 X10*3/UL (ref 1.2–4.8)
LYMPHOCYTES NFR BLD AUTO: 14.9 %
MCH RBC QN AUTO: 14.2 PG (ref 26–34)
MCHC RBC AUTO-ENTMCNC: 23.9 G/DL (ref 32–36)
MCV RBC AUTO: 59 FL (ref 80–100)
MONOCYTES # BLD AUTO: 1.26 X10*3/UL (ref 0.1–1)
MONOCYTES NFR BLD AUTO: 9 %
NEUTROPHILS # BLD AUTO: 10.23 X10*3/UL (ref 1.2–7.7)
NEUTROPHILS NFR BLD AUTO: 73.3 %
NRBC BLD-RTO: 0 /100 WBCS (ref 0–0)
OVALOCYTES BLD QL SMEAR: NORMAL
PLATELET # BLD AUTO: 853 X10*3/UL (ref 150–450)
POLYCHROMASIA BLD QL SMEAR: NORMAL
RBC # BLD AUTO: 4.72 X10*6/UL (ref 4.5–5.9)
RBC MORPH BLD: NORMAL
RH FACTOR (ANTIGEN D): NORMAL
SCHISTOCYTES BLD QL SMEAR: NORMAL
TARGETS BLD QL SMEAR: NORMAL
TIBC SERPL-MCNC: ABNORMAL UG/DL
UIBC SERPL-MCNC: 450 UG/DL (ref 110–370)
VIT B12 SERPL-MCNC: 188 PG/ML (ref 211–946)
WBC # BLD AUTO: 14 X10*3/UL (ref 4.4–11.3)

## 2024-01-11 PROCEDURE — 86900 BLOOD TYPING SEROLOGIC ABO: CPT

## 2024-01-11 PROCEDURE — 83540 ASSAY OF IRON: CPT

## 2024-01-11 PROCEDURE — 86901 BLOOD TYPING SEROLOGIC RH(D): CPT

## 2024-01-11 PROCEDURE — 82607 VITAMIN B-12: CPT

## 2024-01-11 PROCEDURE — 36415 COLL VENOUS BLD VENIPUNCTURE: CPT

## 2024-01-11 PROCEDURE — 74177 CT ABD & PELVIS W/CONTRAST: CPT

## 2024-01-11 PROCEDURE — 86850 RBC ANTIBODY SCREEN: CPT

## 2024-01-11 PROCEDURE — 83550 IRON BINDING TEST: CPT

## 2024-01-11 PROCEDURE — 85025 COMPLETE CBC W/AUTO DIFF WBC: CPT

## 2024-01-11 PROCEDURE — 2550000001 HC RX 255 CONTRASTS: Performed by: INTERNAL MEDICINE

## 2024-01-11 RX ADMIN — IOHEXOL 75 ML: 350 INJECTION, SOLUTION INTRAVENOUS at 14:24

## 2024-01-12 DIAGNOSIS — D50.0 IRON DEFICIENCY ANEMIA DUE TO CHRONIC BLOOD LOSS: Primary | ICD-10-CM

## 2024-01-12 RX ORDER — MAGNESIUM 200 MG
2000 TABLET ORAL DAILY
Qty: 60 TABLET | Refills: 1 | Status: SHIPPED | OUTPATIENT
Start: 2024-01-12 | End: 2024-03-12

## 2024-01-15 ENCOUNTER — APPOINTMENT (OUTPATIENT)
Dept: HEMATOLOGY/ONCOLOGY | Facility: CLINIC | Age: 54
End: 2024-01-15
Payer: COMMERCIAL

## 2024-01-15 PROBLEM — C18.3 MALIGNANT NEOPLASM OF HEPATIC FLEXURE (MULTI): Status: ACTIVE | Noted: 2024-01-10

## 2024-01-15 RX ORDER — HEPARIN SODIUM 5000 [USP'U]/ML
5000 INJECTION, SOLUTION INTRAVENOUS; SUBCUTANEOUS ONCE
Status: CANCELLED | OUTPATIENT
Start: 2024-01-15 | End: 2024-01-15

## 2024-01-15 RX ORDER — NEOMYCIN SULFATE 500 MG/1
TABLET ORAL
Qty: 6 TABLET | Refills: 0 | Status: ON HOLD | OUTPATIENT
Start: 2024-01-15 | End: 2024-01-29 | Stop reason: ALTCHOICE

## 2024-01-15 RX ORDER — GABAPENTIN 100 MG/1
CAPSULE ORAL
Qty: 3 CAPSULE | Refills: 0 | Status: ON HOLD | OUTPATIENT
Start: 2024-01-15 | End: 2024-01-29 | Stop reason: ALTCHOICE

## 2024-01-15 RX ORDER — METRONIDAZOLE 500 MG/100ML
500 INJECTION, SOLUTION INTRAVENOUS ONCE
Status: CANCELLED | OUTPATIENT
Start: 2024-01-15 | End: 2024-01-15

## 2024-01-15 RX ORDER — METRONIDAZOLE 250 MG/1
TABLET ORAL
Qty: 3 TABLET | Refills: 0 | Status: ON HOLD | OUTPATIENT
Start: 2024-01-15 | End: 2024-01-29 | Stop reason: ALTCHOICE

## 2024-01-18 ENCOUNTER — TELEMEDICINE CLINICAL SUPPORT (OUTPATIENT)
Dept: PREADMISSION TESTING | Facility: HOSPITAL | Age: 54
End: 2024-01-18
Payer: COMMERCIAL

## 2024-01-18 DIAGNOSIS — C18.3 MALIGNANT NEOPLASM OF HEPATIC FLEXURE (MULTI): ICD-10-CM

## 2024-01-23 ENCOUNTER — LAB (OUTPATIENT)
Dept: LAB | Facility: LAB | Age: 54
End: 2024-01-23
Payer: COMMERCIAL

## 2024-01-23 ENCOUNTER — PRE-ADMISSION TESTING (OUTPATIENT)
Dept: PREADMISSION TESTING | Facility: HOSPITAL | Age: 54
End: 2024-01-23
Payer: COMMERCIAL

## 2024-01-23 VITALS
OXYGEN SATURATION: 97 % | RESPIRATION RATE: 18 BRPM | WEIGHT: 201.5 LBS | SYSTOLIC BLOOD PRESSURE: 148 MMHG | DIASTOLIC BLOOD PRESSURE: 68 MMHG | TEMPERATURE: 99 F | BODY MASS INDEX: 28.21 KG/M2 | HEART RATE: 97 BPM | HEIGHT: 71 IN

## 2024-01-23 DIAGNOSIS — Z01.818 PREPROCEDURAL EXAMINATION: ICD-10-CM

## 2024-01-23 DIAGNOSIS — C18.3 MALIGNANT NEOPLASM OF HEPATIC FLEXURE (MULTI): ICD-10-CM

## 2024-01-23 DIAGNOSIS — C18.3 MALIGNANT NEOPLASM OF HEPATIC FLEXURE (MULTI): Primary | ICD-10-CM

## 2024-01-23 LAB
ABO GROUP (TYPE) IN BLOOD: NORMAL
ALBUMIN SERPL BCP-MCNC: 4.1 G/DL (ref 3.4–5)
ALP SERPL-CCNC: 110 U/L (ref 33–120)
ALT SERPL W P-5'-P-CCNC: 19 U/L (ref 10–52)
ANION GAP SERPL CALC-SCNC: 16 MMOL/L (ref 10–20)
ANTIBODY SCREEN: NORMAL
AST SERPL W P-5'-P-CCNC: 16 U/L (ref 9–39)
BASOPHILS # BLD AUTO: 0.12 X10*3/UL (ref 0–0.1)
BASOPHILS NFR BLD AUTO: 0.7 %
BILIRUB SERPL-MCNC: 0.5 MG/DL (ref 0–1.2)
BUN SERPL-MCNC: 18 MG/DL (ref 6–23)
CALCIUM SERPL-MCNC: 9.2 MG/DL (ref 8.6–10.3)
CHLORIDE SERPL-SCNC: 97 MMOL/L (ref 98–107)
CO2 SERPL-SCNC: 25 MMOL/L (ref 21–32)
CREAT SERPL-MCNC: 1.08 MG/DL (ref 0.5–1.3)
DACRYOCYTES BLD QL SMEAR: NORMAL
EGFRCR SERPLBLD CKD-EPI 2021: 82 ML/MIN/1.73M*2
EOSINOPHIL # BLD AUTO: 0.37 X10*3/UL (ref 0–0.7)
EOSINOPHIL NFR BLD AUTO: 2.1 %
ERYTHROCYTE [DISTWIDTH] IN BLOOD BY AUTOMATED COUNT: 28.3 % (ref 11.5–14.5)
GLUCOSE SERPL-MCNC: 159 MG/DL (ref 74–99)
HCT VFR BLD AUTO: 35.1 % (ref 41–52)
HGB BLD-MCNC: 8.7 G/DL (ref 13.5–17.5)
HYPOCHROMIA BLD QL SMEAR: NORMAL
IMM GRANULOCYTES # BLD AUTO: 0.1 X10*3/UL (ref 0–0.7)
IMM GRANULOCYTES NFR BLD AUTO: 0.6 % (ref 0–0.9)
LYMPHOCYTES # BLD AUTO: 2.37 X10*3/UL (ref 1.2–4.8)
LYMPHOCYTES NFR BLD AUTO: 13.2 %
MCH RBC QN AUTO: 15.6 PG (ref 26–34)
MCHC RBC AUTO-ENTMCNC: 24.8 G/DL (ref 32–36)
MCV RBC AUTO: 63 FL (ref 80–100)
MONOCYTES # BLD AUTO: 1.49 X10*3/UL (ref 0.1–1)
MONOCYTES NFR BLD AUTO: 8.3 %
NEUTROPHILS # BLD AUTO: 13.48 X10*3/UL (ref 1.2–7.7)
NEUTROPHILS NFR BLD AUTO: 75.1 %
NRBC BLD-RTO: 0 /100 WBCS (ref 0–0)
OVALOCYTES BLD QL SMEAR: NORMAL
PLATELET # BLD AUTO: 708 X10*3/UL (ref 150–450)
POLYCHROMASIA BLD QL SMEAR: NORMAL
POTASSIUM SERPL-SCNC: 4.9 MMOL/L (ref 3.5–5.3)
PROT SERPL-MCNC: 7.7 G/DL (ref 6.4–8.2)
RBC # BLD AUTO: 5.56 X10*6/UL (ref 4.5–5.9)
RBC MORPH BLD: NORMAL
RH FACTOR (ANTIGEN D): NORMAL
SCHISTOCYTES BLD QL SMEAR: NORMAL
SODIUM SERPL-SCNC: 133 MMOL/L (ref 136–145)
WBC # BLD AUTO: 17.9 X10*3/UL (ref 4.4–11.3)

## 2024-01-23 PROCEDURE — 85025 COMPLETE CBC W/AUTO DIFF WBC: CPT

## 2024-01-23 PROCEDURE — 86850 RBC ANTIBODY SCREEN: CPT

## 2024-01-23 PROCEDURE — 36415 COLL VENOUS BLD VENIPUNCTURE: CPT

## 2024-01-23 PROCEDURE — 80053 COMPREHEN METABOLIC PANEL: CPT

## 2024-01-23 PROCEDURE — 86901 BLOOD TYPING SEROLOGIC RH(D): CPT

## 2024-01-23 PROCEDURE — 99204 OFFICE O/P NEW MOD 45 MIN: CPT | Performed by: NURSE PRACTITIONER

## 2024-01-23 PROCEDURE — 87081 CULTURE SCREEN ONLY: CPT | Mod: AHULAB | Performed by: NURSE PRACTITIONER

## 2024-01-23 PROCEDURE — 86900 BLOOD TYPING SEROLOGIC ABO: CPT

## 2024-01-23 RX ORDER — CHLORHEXIDINE GLUCONATE ORAL RINSE 1.2 MG/ML
15 SOLUTION DENTAL DAILY
Qty: 30 ML | Refills: 0 | Status: SHIPPED | OUTPATIENT
Start: 2024-01-23 | End: 2024-01-31 | Stop reason: HOSPADM

## 2024-01-23 ASSESSMENT — ENCOUNTER SYMPTOMS
NEUROLOGICAL NEGATIVE: 1
CARDIOVASCULAR NEGATIVE: 1
MUSCULOSKELETAL NEGATIVE: 1
RESPIRATORY NEGATIVE: 1
CONSTITUTIONAL NEGATIVE: 1
GASTROINTESTINAL NEGATIVE: 1
NECK NEGATIVE: 1

## 2024-01-23 NOTE — CPM/PAT H&P
CPM/PAT Evaluation       Name: Eze Damian (Eze Damian)  /Age: 1970/53 y.o.     SURGEON :DR JASON JOVEL     Surgery, Date, and Length:  Laparoscopic Right Extended Colectomy , 24  HPI:  This a 53y.o. -male who presents for presurgical evaluation for for above mentioned procedure   . Patient states he went to a new PCP in November , He was found to be anemic , and was referred for colonoscopy . On exam a large ulcerated mass was found at the hepatic flexure . Biopsies positive for adenocarcinoma .After discussion of the risks and benefits with Dr. JOVEL the patient elects to proceed with the planned procedure.       Past Medical History:   Diagnosis Date    Anemia     H/H 6.24    Colon cancer (CMS/HCC)     new diagnosis, 2023    GERD (gastroesophageal reflux disease)     Hyperlipidemia     Hypertension     Tobacco use disorder, mild, in early remission 2023    Quit chewing tobacco       Past Surgical History:   Procedure Laterality Date    COLONOSCOPY W/ BIOPSIES  2023    KNEE CARTILAGE SURGERY Left     TONSILLECTOMY       Anesthesia History  Pt denies any past history of anesthetic complications such as PONV, awareness, prolonged sedation, dental damage, aspiration, cardiac arrest, difficult intubation, difficult I.V. access or unexpected hospital admissions.  NO malignant hyperthermia and or pseudo cholinesterase deficiency.    The patient is not  a [employer] and will accept blood and blood products if medically indicated.   No history of blood transfusions .Type and screen  sent.     Social History  Social History     Substance and Sexual Activity   Drug Use Never      Social History     Substance and Sexual Activity   Alcohol Use Yes    Comment: drinks [id number]-4-2 drinks a month      Social History     Tobacco Use   Smoking Status Never   Smokeless Tobacco Former    Types: Chew    Quit date: 2023   Tobacco Comments    2 small pouches a day x 4-5 years           Family History   Problem Relation Name Age of Onset    Hypertension Mother      No Known Problems Father      No Known Problems Brother      Heart disease Maternal Grandfather         No Known Allergies    Prior to Admission medications    Medication Sig Start Date End Date Taking? Authorizing Provider   amLODIPine (Norvasc) 10 mg tablet Take 1 tablet (10 mg) by mouth once daily. 10/31/23 10/30/24  Brant Nieves MD   atorvastatin (Lipitor) 20 mg tablet Take 1 tablet (20 mg) by mouth once daily. 10/31/23 10/30/24  Brant Nieves MD   chlorhexidine (Peridex) 0.12 % solution Use 15 mL in the mouth or throat once daily for 2 days. 1/23/24 1/25/24  ARCENIO Aburto   cyanocobalamin, vitamin B-12, 1,000 mcg tablet, sublingual Place 2 tablets (2,000 mcg) under the tongue once daily. 1/12/24 3/12/24  Mona Hernandez MD   ferrous sulfate 325 (65 Fe) MG EC tablet Take 2 tablets by mouth once daily with breakfast. Do not crush, chew, or split. 1/10/24 1/9/25  Mona Hernandez MD   gabapentin (Neurontin) 100 mg capsule Take one tablet at bedtime 3 nights before surgery 1/15/24   ARCENIO Joshi   GaviLyte-G 236-22.74-6.74 -5.86 gram solution MIX AND DRINK 1/2 STARTING 6 PM THE NGIHT BEFORE PROCEDURE THEN OTHER HALF 5 HOURS BEFORE PROCEDURE 11/14/23   Historical Provider, MD   metroNIDAZOLE (Flagyl) 250 mg tablet Take one tablet at 6PM, 7PM,and 11PM the day before surgery 1/15/24   ARCENIO Joshi   NATURAL BEE POLLEN ORAL Take 1 capsule by mouth every other day.    Historical Provider, MD   neomycin (Mycifradin) 500 mg tablet Take two tablets (1000 mg) by mouth at 3:00pm, 4:00pm and at bed time on the day prior to surgery 1/15/24 2/14/24  ARCENIO Joshi   pantoprazole (ProtoNix) 40 mg EC tablet Take 1 tablet (40 mg) by mouth once daily. Do not crush, chew, or split. 1/8/24 2/7/24  Brant Nieves MD   triamterene-hydrochlorothiazid (Maxzide) 75-50 mg tablet Take 1 tablet  "by mouth once daily. 10/31/23 1/18/24  Brant Nieves MD        PAT ROS:   Constitutional:   neg    Neuro/Psych:   neg    Eyes:   Ears:   Nose:   neg    Mouth:   neg    Throat:   neg    Neck:   neg    Cardio:   neg    Respiratory:   neg    Endocrine:   GI:   neg    :   neg    Musculoskeletal:   neg    Hematologic:   neg    Skin:  neg        Physical Exam  Vitals reviewed.   Constitutional:       Appearance: Normal appearance.   HENT:      Head: Normocephalic and atraumatic.      Mouth/Throat:      Mouth: Mucous membranes are moist.   Eyes:      Extraocular Movements: Extraocular movements intact.      Pupils: Pupils are equal, round, and reactive to light.   Cardiovascular:      Rate and Rhythm: Normal rate and regular rhythm.      Pulses: Normal pulses.      Heart sounds: Normal heart sounds.   Pulmonary:      Effort: Pulmonary effort is normal.      Breath sounds: Normal breath sounds.   Musculoskeletal:         General: Normal range of motion.      Cervical back: Normal range of motion.   Skin:     General: Skin is warm.   Neurological:      Mental Status: He is alert and oriented to person, place, and time.   Psychiatric:         Mood and Affect: Mood normal.         Behavior: Behavior normal.          PAT AIRWAY:   Airway:     Mallampati::  II  normal      /68   Pulse 97   Temp 37.2 °C (99 °F)   Resp 18   Ht 1.803 m (5' 11\")   Wt 91.4 kg (201 lb 8 oz)   SpO2 97%   BMI 28.10 kg/m²     Lab Results   Component Value Date    WBC 17.9 (H) 01/23/2024    HGB 8.7 (L) 01/23/2024    HCT 35.1 (L) 01/23/2024    MCV 63 (L) 01/23/2024     (H) 01/23/2024     Results from last 7 days   Lab Units 01/23/24  1552   SODIUM mmol/L 133*   POTASSIUM mmol/L 4.9   CHLORIDE mmol/L 97*   CO2 mmol/L 25   BUN mg/dL 18   CREATININE mg/dL 1.08   CALCIUM mg/dL 9.2   PROTEIN TOTAL g/dL 7.7   BILIRUBIN TOTAL mg/dL 0.5   ALK PHOS U/L 110   ALT U/L 19   AST U/L 16   GLUCOSE mg/dL 159*             ASSESSMENT/PLAN    Patient " is a 53year-old  scheduled for Laparoscopic Right Extended Colectomy  with Dr. JOVEL   on  1/29/24 .  CARDIOVASCULAR:  RCRI score / Risk: The patients score is 0 based on history . Per ACC/AHA guidelines this places him at  3.9% risk for MACE undergoing a intermediate  risk procedure . The patient has the following risk factors: DENIES   Functional Capacity: The patients exercise tolerance is  4  METS. This is based on the patients abililty to ascend a flight of stairs  and walk 2 block w/o chest pain or other anginal equivalents.  . Patient denies  active cardiac symptoms or anginal equivalents .      PULMONARY:  The patient has the following factors that place them at increased risk of perioperative pulmonary complications;/BMI greater than 27/intraabdominal procedure with insufflation s/greater than 2.5 hour procedure.  Postoperatively the patient would benefit from early pulmonary toilet/incentive spirometry q 1-2 hours while awake/pulse oximetry/cautious use of respiratory depressant medications such as opioids/elevate the HOB/oral hygiene.    ANEMIA :  The patient has a history of anemia.  The patient’s anemia appears to to have improved when compared to prior labs.  Type and screen obtained.Pt is on appropriate supplementation .    LEUKOCYTOSIS :  Patient had leukocytosis with a white count of  17.9 .    THROMBOPHILIA :  The patient’s platelet count came back at  708.  This is a not a new  finding when compared to prior lab results.  Pt denied upper respiratory of urinary symptoms of an infection .The patient has colon cancer , both leukocytosis and thrombophilia most likely inflammatory response .     DVT:  CAPRINI SCORE=7  The patient has the following factors that increase his  Risk for thrombus formation ; Virchow's triad ,age>50, bmi >25 , intraabdominal procedure, malignancy Surgical procedure >2 hrs  procedure .    Recommendations: DVT prophylaxis  per   protocol . SCD's, AN's, and  early ambulation are recommended. Heparin or LMWH is recommended for the very high risk .      Risk assessment complete.  Patient is scheduled for  INTERMEDIATE  surgical risk procedure.  Patient is considered an acceptable  risk to proceed with the planned procedure.      Preoperative medication instructions were provided and reviewed with the patient.  Any additional testing or evaluation was explained to the patient.  Nothing by mouth instructions were discussed and patient's questions were answered prior to conclusion to this encounter.  Patient verbalized understanding of preoperative instructions given in preadmission testing; discharge instructions available in EMR.

## 2024-01-23 NOTE — PREPROCEDURE INSTRUCTIONS
Medication List            Accurate as of January 23, 2024  3:34 PM. Always use your most recent med list.                amLODIPine 10 mg tablet  Commonly known as: Norvasc  Take 1 tablet (10 mg) by mouth once daily.  Medication Adjustments for Surgery: Take morning of surgery with sip of water, no other fluids     atorvastatin 20 mg tablet  Commonly known as: Lipitor  Take 1 tablet (20 mg) by mouth once daily.  Medication Adjustments for Surgery: Take morning of surgery with sip of water, no other fluids     chlorhexidine 0.12 % solution  Commonly known as: Peridex  Use 15 mL in the mouth or throat once daily for 2 days.  Notes to patient: Use as instructed     cyanocobalamin (vitamin B-12) 1,000 mcg tablet, sublingual  Place 2 tablets (2,000 mcg) under the tongue once daily.  Medication Adjustments for Surgery: Continue until night before surgery     ferrous sulfate 325 (65 Fe) MG EC tablet  Take 2 tablets by mouth once daily with breakfast. Do not crush, chew, or split.  Medication Adjustments for Surgery: Continue until night before surgery     gabapentin 100 mg capsule  Commonly known as: Neurontin  Take one tablet at bedtime 3 nights before surgery  Notes to patient: Take as instructed      GaviLyte-G 420 gram solution  Generic drug: polyethylene glycol-electrolytes  Notes to patient: Use as instructed      metroNIDAZOLE 250 mg tablet  Commonly known as: Flagyl  Take one tablet at 6PM, 7PM,and 11PM the day before surgery  Notes to patient: Take as instructed      NATURAL BEE POLLEN ORAL  Medication Adjustments for Surgery: Stop 7 days before surgery     neomycin 500 mg tablet  Commonly known as: Mycifradin  Take two tablets (1000 mg) by mouth at 3:00pm, 4:00pm and at bed time on the day prior to surgery  Notes to patient: Take as instructed      pantoprazole 40 mg EC tablet  Commonly known as: ProtoNix  Take 1 tablet (40 mg) by mouth once daily. Do not crush, chew, or split.  Medication Adjustments for  Surgery: Take morning of surgery with sip of water, no other fluids                      CONTACT SURGEON'S OFFICE IF YOU DEVELOP:  * Fever = 100.4 F   * New respiratory symptoms (e.g. cough, shortness of breath, respiratory distress, sore throat)  * Recent loss of taste or smell  *Flu like symptoms such as headache, fatigue or gastrointestinal symptoms  * You develop any open sores, shingles, burning or painful urination   AND/OR:  * You no longer wish to have the surgery.  * Any other personal circumstances change that may lead to the need to cancel or defer this surgery.  *You were admitted to any hospital within one week of your planned procedure.    SMOKING:  *Quitting smoking can make a huge difference to your health and recovery from surgery.    *If you need help with quitting, call 5-930-QUIT-NOW.    THE DAY BEFORE SURGERY:  *Do not eat any food after midnight the night before surgery.   *You are permitted to drink clear liquids (i.e. water, black coffee, tea, clear broth, apple juice) up to 2 hours before your surgery.  DIABETICS:  Please check fasting blood sugar  upon waking up.  If fasting sugar is <80 mg/dl, please drink 100ml/3oz of apple juice no later than 2 hours prior to surgery.      SURGICAL TIME  *You will be contacted between 2 p.m. and 6 p.m. the business day before your surgery with your arrival time.  *If you haven't received a call by 6pm, call [phone].  *Scheduled surgery times may change and you will be notified if this occurs-check your personal voicemail for any updates.    ON THE MORNING OF SURGERY:  *Wear comfortable, loose fitting clothing.   *Do not use moisturizers, creams, lotions or perfume.  *All jewelry and valuables should be left at home.  *Prosthetic devices such as contact lenses, hearing aids, dentures, eyelash extensions, hairpins and body piercing must be removed before surgery.    BRING WITH YOU:  *Photo ID and insurance card  *Current list of medicines and  allergies  *Pacemaker/Defibrillator/Heart stent cards  *CPAP machine and mask  *Slings/splints/crutches  *Copy of your complete Advanced Directive/DHPOA-if applicable  *Neurostimulator implant remote    PARKING AND ARRIVAL:  *Check in at the Main Entrance desk and let them know you are here for surgery.  *You will be directed to the 2nd floor surgical waiting area.    AFTER OUTPATIENT SURGERY:  *A responsible adult MUST accompany you at the time of discharge and stay with you for 24 hours after your surgery.  *You may NOT drive yourself home after surgery.  *You may use a taxi or ride sharing service ([employer], Uber) to return home ONLY if you are accompanied by a friend or family member.  *Instructions for resuming your medications will be provided by your surgeon.      YOU HAVE REVIEWED THE MEDICATIONS ON THIS SHEET AND YOU VERIFY THESE ARE ALL THE MEDICATIONS AND OVER THE COUNTER MEDICATIONS THAT YOU TAKE .

## 2024-01-25 LAB — STAPHYLOCOCCUS SPEC CULT: NORMAL

## 2024-01-29 ENCOUNTER — HOSPITAL ENCOUNTER (INPATIENT)
Facility: HOSPITAL | Age: 54
LOS: 2 days | Discharge: HOME | DRG: 330 | End: 2024-01-31
Attending: COLON & RECTAL SURGERY | Admitting: COLON & RECTAL SURGERY
Payer: COMMERCIAL

## 2024-01-29 ENCOUNTER — ANESTHESIA EVENT (OUTPATIENT)
Dept: OPERATING ROOM | Facility: HOSPITAL | Age: 54
DRG: 330 | End: 2024-01-29
Payer: COMMERCIAL

## 2024-01-29 ENCOUNTER — ANESTHESIA (OUTPATIENT)
Dept: OPERATING ROOM | Facility: HOSPITAL | Age: 54
DRG: 330 | End: 2024-01-29
Payer: COMMERCIAL

## 2024-01-29 DIAGNOSIS — C18.3 MALIGNANT NEOPLASM OF HEPATIC FLEXURE (MULTI): Primary | ICD-10-CM

## 2024-01-29 DIAGNOSIS — G89.18 POST-OP PAIN: ICD-10-CM

## 2024-01-29 LAB
ABO GROUP (TYPE) IN BLOOD: NORMAL
RH FACTOR (ANTIGEN D): NORMAL

## 2024-01-29 PROCEDURE — 0DTF4ZZ RESECTION OF RIGHT LARGE INTESTINE, PERCUTANEOUS ENDOSCOPIC APPROACH: ICD-10-PCS | Performed by: COLON & RECTAL SURGERY

## 2024-01-29 PROCEDURE — 7100000001 HC RECOVERY ROOM TIME - INITIAL BASE CHARGE: Performed by: COLON & RECTAL SURGERY

## 2024-01-29 PROCEDURE — 7100000002 HC RECOVERY ROOM TIME - EACH INCREMENTAL 1 MINUTE: Performed by: COLON & RECTAL SURGERY

## 2024-01-29 PROCEDURE — 2500000004 HC RX 250 GENERAL PHARMACY W/ HCPCS (ALT 636 FOR OP/ED): Performed by: ANESTHESIOLOGIST ASSISTANT

## 2024-01-29 PROCEDURE — 2720000007 HC OR 272 NO HCPCS: Performed by: COLON & RECTAL SURGERY

## 2024-01-29 PROCEDURE — 2500000004 HC RX 250 GENERAL PHARMACY W/ HCPCS (ALT 636 FOR OP/ED): Performed by: COLON & RECTAL SURGERY

## 2024-01-29 PROCEDURE — A44204 PR LAP,SURG,COLECTOMY, PARTIAL, W/ANAST: Performed by: ANESTHESIOLOGIST ASSISTANT

## 2024-01-29 PROCEDURE — 36415 COLL VENOUS BLD VENIPUNCTURE: CPT | Performed by: COLON & RECTAL SURGERY

## 2024-01-29 PROCEDURE — 2500000001 HC RX 250 WO HCPCS SELF ADMINISTERED DRUGS (ALT 637 FOR MEDICARE OP): Performed by: ANESTHESIOLOGY

## 2024-01-29 PROCEDURE — 3600000009 HC OR TIME - EACH INCREMENTAL 1 MINUTE - PROCEDURE LEVEL FOUR: Performed by: COLON & RECTAL SURGERY

## 2024-01-29 PROCEDURE — 88309 TISSUE EXAM BY PATHOLOGIST: CPT | Performed by: STUDENT IN AN ORGANIZED HEALTH CARE EDUCATION/TRAINING PROGRAM

## 2024-01-29 PROCEDURE — 2500000004 HC RX 250 GENERAL PHARMACY W/ HCPCS (ALT 636 FOR OP/ED): Performed by: NURSE PRACTITIONER

## 2024-01-29 PROCEDURE — 2500000005 HC RX 250 GENERAL PHARMACY W/O HCPCS: Performed by: COLON & RECTAL SURGERY

## 2024-01-29 PROCEDURE — 2500000001 HC RX 250 WO HCPCS SELF ADMINISTERED DRUGS (ALT 637 FOR MEDICARE OP): Performed by: COLON & RECTAL SURGERY

## 2024-01-29 PROCEDURE — A44204 PR LAP,SURG,COLECTOMY, PARTIAL, W/ANAST: Performed by: ANESTHESIOLOGY

## 2024-01-29 PROCEDURE — 3600000004 HC OR TIME - INITIAL BASE CHARGE - PROCEDURE LEVEL FOUR: Performed by: COLON & RECTAL SURGERY

## 2024-01-29 PROCEDURE — 3700000001 HC GENERAL ANESTHESIA TIME - INITIAL BASE CHARGE: Performed by: COLON & RECTAL SURGERY

## 2024-01-29 PROCEDURE — 1100000001 HC PRIVATE ROOM DAILY

## 2024-01-29 PROCEDURE — 88309 TISSUE EXAM BY PATHOLOGIST: CPT | Mod: TC,SUR,AHULAB | Performed by: NURSE PRACTITIONER

## 2024-01-29 PROCEDURE — 44205 LAP COLECTOMY PART W/ILEUM: CPT | Performed by: COLON & RECTAL SURGERY

## 2024-01-29 PROCEDURE — 2500000005 HC RX 250 GENERAL PHARMACY W/O HCPCS: Performed by: ANESTHESIOLOGIST ASSISTANT

## 2024-01-29 PROCEDURE — 99231 SBSQ HOSP IP/OBS SF/LOW 25: CPT

## 2024-01-29 PROCEDURE — 3700000002 HC GENERAL ANESTHESIA TIME - EACH INCREMENTAL 1 MINUTE: Performed by: COLON & RECTAL SURGERY

## 2024-01-29 RX ORDER — ONDANSETRON 4 MG/1
4 TABLET, ORALLY DISINTEGRATING ORAL EVERY 8 HOURS PRN
Status: DISCONTINUED | OUTPATIENT
Start: 2024-01-29 | End: 2024-01-31 | Stop reason: HOSPADM

## 2024-01-29 RX ORDER — LIDOCAINE HYDROCHLORIDE 10 MG/ML
0.1 INJECTION, SOLUTION EPIDURAL; INFILTRATION; INTRACAUDAL; PERINEURAL ONCE
Status: DISCONTINUED | OUTPATIENT
Start: 2024-01-29 | End: 2024-01-29 | Stop reason: HOSPADM

## 2024-01-29 RX ORDER — HYDROMORPHONE HYDROCHLORIDE 1 MG/ML
INJECTION, SOLUTION INTRAMUSCULAR; INTRAVENOUS; SUBCUTANEOUS AS NEEDED
Status: DISCONTINUED | OUTPATIENT
Start: 2024-01-29 | End: 2024-01-29

## 2024-01-29 RX ORDER — FENTANYL CITRATE 50 UG/ML
INJECTION, SOLUTION INTRAMUSCULAR; INTRAVENOUS AS NEEDED
Status: DISCONTINUED | OUTPATIENT
Start: 2024-01-29 | End: 2024-01-29

## 2024-01-29 RX ORDER — PROPOFOL 10 MG/ML
INJECTION, EMULSION INTRAVENOUS AS NEEDED
Status: DISCONTINUED | OUTPATIENT
Start: 2024-01-29 | End: 2024-01-29

## 2024-01-29 RX ORDER — LABETALOL HYDROCHLORIDE 5 MG/ML
5 INJECTION, SOLUTION INTRAVENOUS ONCE AS NEEDED
Status: DISCONTINUED | OUTPATIENT
Start: 2024-01-29 | End: 2024-01-29 | Stop reason: HOSPADM

## 2024-01-29 RX ORDER — ACETAMINOPHEN 325 MG/1
650 TABLET ORAL EVERY 6 HOURS
Status: DISCONTINUED | OUTPATIENT
Start: 2024-01-29 | End: 2024-01-31 | Stop reason: HOSPADM

## 2024-01-29 RX ORDER — CEFAZOLIN 1 G/1
INJECTION, POWDER, FOR SOLUTION INTRAVENOUS AS NEEDED
Status: DISCONTINUED | OUTPATIENT
Start: 2024-01-29 | End: 2024-01-29

## 2024-01-29 RX ORDER — SODIUM CHLORIDE 9 MG/ML
40 INJECTION, SOLUTION INTRAVENOUS CONTINUOUS
Status: DISCONTINUED | OUTPATIENT
Start: 2024-01-29 | End: 2024-01-31 | Stop reason: HOSPADM

## 2024-01-29 RX ORDER — ONDANSETRON HYDROCHLORIDE 2 MG/ML
INJECTION, SOLUTION INTRAVENOUS AS NEEDED
Status: DISCONTINUED | OUTPATIENT
Start: 2024-01-29 | End: 2024-01-29

## 2024-01-29 RX ORDER — OXYCODONE HYDROCHLORIDE 5 MG/1
10 TABLET ORAL EVERY 4 HOURS PRN
Status: DISCONTINUED | OUTPATIENT
Start: 2024-01-29 | End: 2024-01-31 | Stop reason: HOSPADM

## 2024-01-29 RX ORDER — ONDANSETRON HYDROCHLORIDE 2 MG/ML
4 INJECTION, SOLUTION INTRAVENOUS EVERY 8 HOURS PRN
Status: DISCONTINUED | OUTPATIENT
Start: 2024-01-29 | End: 2024-01-31 | Stop reason: HOSPADM

## 2024-01-29 RX ORDER — OXYCODONE HYDROCHLORIDE 5 MG/1
5 TABLET ORAL EVERY 4 HOURS PRN
Status: DISCONTINUED | OUTPATIENT
Start: 2024-01-29 | End: 2024-01-31 | Stop reason: HOSPADM

## 2024-01-29 RX ORDER — SODIUM CHLORIDE, SODIUM LACTATE, POTASSIUM CHLORIDE, CALCIUM CHLORIDE 600; 310; 30; 20 MG/100ML; MG/100ML; MG/100ML; MG/100ML
100 INJECTION, SOLUTION INTRAVENOUS CONTINUOUS
Status: DISCONTINUED | OUTPATIENT
Start: 2024-01-29 | End: 2024-01-29 | Stop reason: HOSPADM

## 2024-01-29 RX ORDER — GABAPENTIN 300 MG/1
300 CAPSULE ORAL 3 TIMES DAILY
Status: DISCONTINUED | OUTPATIENT
Start: 2024-01-29 | End: 2024-01-31 | Stop reason: HOSPADM

## 2024-01-29 RX ORDER — PHENYLEPHRINE HCL IN 0.9% NACL 0.4MG/10ML
SYRINGE (ML) INTRAVENOUS AS NEEDED
Status: DISCONTINUED | OUTPATIENT
Start: 2024-01-29 | End: 2024-01-29

## 2024-01-29 RX ORDER — ONDANSETRON HYDROCHLORIDE 2 MG/ML
4 INJECTION, SOLUTION INTRAVENOUS ONCE AS NEEDED
Status: DISCONTINUED | OUTPATIENT
Start: 2024-01-29 | End: 2024-01-29 | Stop reason: HOSPADM

## 2024-01-29 RX ORDER — SODIUM CHLORIDE, SODIUM LACTATE, POTASSIUM CHLORIDE, CALCIUM CHLORIDE 600; 310; 30; 20 MG/100ML; MG/100ML; MG/100ML; MG/100ML
INJECTION, SOLUTION INTRAVENOUS CONTINUOUS PRN
Status: DISCONTINUED | OUTPATIENT
Start: 2024-01-29 | End: 2024-01-29

## 2024-01-29 RX ORDER — DEXAMETHASONE SODIUM PHOSPHATE 4 MG/ML
INJECTION, SOLUTION INTRA-ARTICULAR; INTRALESIONAL; INTRAMUSCULAR; INTRAVENOUS; SOFT TISSUE AS NEEDED
Status: DISCONTINUED | OUTPATIENT
Start: 2024-01-29 | End: 2024-01-29

## 2024-01-29 RX ORDER — OXYCODONE HYDROCHLORIDE 5 MG/1
5 TABLET ORAL EVERY 4 HOURS PRN
Status: DISCONTINUED | OUTPATIENT
Start: 2024-01-29 | End: 2024-01-29 | Stop reason: HOSPADM

## 2024-01-29 RX ORDER — METRONIDAZOLE 500 MG/100ML
500 INJECTION, SOLUTION INTRAVENOUS ONCE
Status: COMPLETED | OUTPATIENT
Start: 2024-01-29 | End: 2024-01-29

## 2024-01-29 RX ORDER — HYDRALAZINE HYDROCHLORIDE 20 MG/ML
5 INJECTION INTRAMUSCULAR; INTRAVENOUS EVERY 30 MIN PRN
Status: DISCONTINUED | OUTPATIENT
Start: 2024-01-29 | End: 2024-01-29 | Stop reason: HOSPADM

## 2024-01-29 RX ORDER — AMLODIPINE BESYLATE 10 MG/1
10 TABLET ORAL DAILY
Status: DISCONTINUED | OUTPATIENT
Start: 2024-01-29 | End: 2024-01-31 | Stop reason: HOSPADM

## 2024-01-29 RX ORDER — KETOROLAC TROMETHAMINE 30 MG/ML
15 INJECTION, SOLUTION INTRAMUSCULAR; INTRAVENOUS EVERY 6 HOURS SCHEDULED
Status: DISCONTINUED | OUTPATIENT
Start: 2024-01-29 | End: 2024-01-30

## 2024-01-29 RX ORDER — NALOXONE HYDROCHLORIDE 0.4 MG/ML
0.2 INJECTION, SOLUTION INTRAMUSCULAR; INTRAVENOUS; SUBCUTANEOUS EVERY 5 MIN PRN
Status: DISCONTINUED | OUTPATIENT
Start: 2024-01-29 | End: 2024-01-31 | Stop reason: HOSPADM

## 2024-01-29 RX ORDER — PROMETHAZINE HYDROCHLORIDE 25 MG/ML
6.25 INJECTION, SOLUTION INTRAMUSCULAR; INTRAVENOUS ONCE AS NEEDED
Status: DISCONTINUED | OUTPATIENT
Start: 2024-01-29 | End: 2024-01-29 | Stop reason: HOSPADM

## 2024-01-29 RX ORDER — ROCURONIUM BROMIDE 10 MG/ML
INJECTION, SOLUTION INTRAVENOUS AS NEEDED
Status: DISCONTINUED | OUTPATIENT
Start: 2024-01-29 | End: 2024-01-29

## 2024-01-29 RX ORDER — ALBUTEROL SULFATE 0.83 MG/ML
2.5 SOLUTION RESPIRATORY (INHALATION) ONCE AS NEEDED
Status: DISCONTINUED | OUTPATIENT
Start: 2024-01-29 | End: 2024-01-29 | Stop reason: HOSPADM

## 2024-01-29 RX ORDER — ENOXAPARIN SODIUM 100 MG/ML
40 INJECTION SUBCUTANEOUS EVERY 24 HOURS
Status: DISCONTINUED | OUTPATIENT
Start: 2024-01-29 | End: 2024-01-31 | Stop reason: HOSPADM

## 2024-01-29 RX ORDER — HEPARIN SODIUM 5000 [USP'U]/ML
5000 INJECTION, SOLUTION INTRAVENOUS; SUBCUTANEOUS ONCE
Status: COMPLETED | OUTPATIENT
Start: 2024-01-29 | End: 2024-01-29

## 2024-01-29 RX ORDER — ATORVASTATIN CALCIUM 20 MG/1
20 TABLET, FILM COATED ORAL DAILY
Status: DISCONTINUED | OUTPATIENT
Start: 2024-01-29 | End: 2024-01-31 | Stop reason: HOSPADM

## 2024-01-29 RX ORDER — CEFAZOLIN SODIUM 2 G/100ML
2 INJECTION, SOLUTION INTRAVENOUS ONCE
Status: DISCONTINUED | OUTPATIENT
Start: 2024-01-29 | End: 2024-01-29 | Stop reason: HOSPADM

## 2024-01-29 RX ORDER — MIDAZOLAM HYDROCHLORIDE 1 MG/ML
INJECTION INTRAMUSCULAR; INTRAVENOUS AS NEEDED
Status: DISCONTINUED | OUTPATIENT
Start: 2024-01-29 | End: 2024-01-29

## 2024-01-29 RX ORDER — LIDOCAINE HYDROCHLORIDE 20 MG/ML
INJECTION, SOLUTION INFILTRATION; PERINEURAL AS NEEDED
Status: DISCONTINUED | OUTPATIENT
Start: 2024-01-29 | End: 2024-01-29

## 2024-01-29 RX ADMIN — DEXAMETHASONE SODIUM PHOSPHATE 8 MG: 4 INJECTION, SOLUTION INTRAMUSCULAR; INTRAVENOUS at 12:10

## 2024-01-29 RX ADMIN — ROCURONIUM BROMIDE 20 MG: 10 INJECTION, SOLUTION INTRAVENOUS at 12:55

## 2024-01-29 RX ADMIN — ROCURONIUM BROMIDE 60 MG: 10 INJECTION, SOLUTION INTRAVENOUS at 11:57

## 2024-01-29 RX ADMIN — METRONIDAZOLE 500 MG: 500 INJECTION, SOLUTION INTRAVENOUS at 09:59

## 2024-01-29 RX ADMIN — CEFAZOLIN 2 G: 330 INJECTION, POWDER, FOR SOLUTION INTRAMUSCULAR; INTRAVENOUS at 12:05

## 2024-01-29 RX ADMIN — SUGAMMADEX 200 MG: 100 INJECTION, SOLUTION INTRAVENOUS at 13:30

## 2024-01-29 RX ADMIN — ONDANSETRON 4 MG: 2 INJECTION INTRAMUSCULAR; INTRAVENOUS at 13:11

## 2024-01-29 RX ADMIN — ACETAMINOPHEN 650 MG: 325 TABLET ORAL at 16:24

## 2024-01-29 RX ADMIN — ENOXAPARIN SODIUM 40 MG: 40 INJECTION SUBCUTANEOUS at 20:39

## 2024-01-29 RX ADMIN — LIDOCAINE HYDROCHLORIDE 100 MG: 20 INJECTION, SOLUTION INFILTRATION; PERINEURAL at 11:56

## 2024-01-29 RX ADMIN — MIDAZOLAM HYDROCHLORIDE 2 MG: 1 INJECTION, SOLUTION INTRAMUSCULAR; INTRAVENOUS at 11:45

## 2024-01-29 RX ADMIN — SODIUM CHLORIDE, POTASSIUM CHLORIDE, SODIUM LACTATE AND CALCIUM CHLORIDE: 600; 310; 30; 20 INJECTION, SOLUTION INTRAVENOUS at 11:18

## 2024-01-29 RX ADMIN — HYDROMORPHONE HYDROCHLORIDE 1 MG: 1 INJECTION, SOLUTION INTRAMUSCULAR; INTRAVENOUS; SUBCUTANEOUS at 12:11

## 2024-01-29 RX ADMIN — HEPARIN SODIUM 5000 UNITS: 5000 INJECTION INTRAVENOUS; SUBCUTANEOUS at 09:59

## 2024-01-29 RX ADMIN — OXYCODONE HYDROCHLORIDE 5 MG: 5 TABLET ORAL at 14:15

## 2024-01-29 RX ADMIN — KETOROLAC TROMETHAMINE 15 MG: 30 INJECTION, SOLUTION INTRAMUSCULAR; INTRAVENOUS at 23:41

## 2024-01-29 RX ADMIN — ACETAMINOPHEN 650 MG: 325 TABLET ORAL at 21:45

## 2024-01-29 RX ADMIN — SODIUM CHLORIDE 40 ML/HR: 9 INJECTION, SOLUTION INTRAVENOUS at 16:23

## 2024-01-29 RX ADMIN — IRON SUCROSE 100 MG: 20 INJECTION, SOLUTION INTRAVENOUS at 16:23

## 2024-01-29 RX ADMIN — ROCURONIUM BROMIDE 20 MG: 10 INJECTION, SOLUTION INTRAVENOUS at 13:12

## 2024-01-29 RX ADMIN — FENTANYL CITRATE 100 MCG: 50 INJECTION, SOLUTION INTRAMUSCULAR; INTRAVENOUS at 11:51

## 2024-01-29 RX ADMIN — ATORVASTATIN CALCIUM 20 MG: 20 TABLET, FILM COATED ORAL at 20:40

## 2024-01-29 RX ADMIN — AMLODIPINE BESYLATE 10 MG: 10 TABLET ORAL at 16:23

## 2024-01-29 RX ADMIN — GABAPENTIN 300 MG: 300 CAPSULE ORAL at 16:23

## 2024-01-29 RX ADMIN — Medication 200 MCG: at 12:26

## 2024-01-29 RX ADMIN — ROCURONIUM BROMIDE 20 MG: 10 INJECTION, SOLUTION INTRAVENOUS at 12:30

## 2024-01-29 RX ADMIN — KETOROLAC TROMETHAMINE 15 MG: 30 INJECTION, SOLUTION INTRAMUSCULAR; INTRAVENOUS at 18:45

## 2024-01-29 RX ADMIN — GABAPENTIN 300 MG: 300 CAPSULE ORAL at 20:40

## 2024-01-29 RX ADMIN — PROPOFOL 200 MG: 10 INJECTION, EMULSION INTRAVENOUS at 11:56

## 2024-01-29 SDOH — HEALTH STABILITY: MENTAL HEALTH: CURRENT SMOKER: 0

## 2024-01-29 ASSESSMENT — COGNITIVE AND FUNCTIONAL STATUS - GENERAL
DRESSING REGULAR LOWER BODY CLOTHING: A LITTLE
WALKING IN HOSPITAL ROOM: A LITTLE
MOBILITY SCORE: 22
HELP NEEDED FOR BATHING: A LITTLE
TOILETING: A LITTLE
DRESSING REGULAR UPPER BODY CLOTHING: A LITTLE
MOBILITY SCORE: 21
DAILY ACTIVITIY SCORE: 19
CLIMB 3 TO 5 STEPS WITH RAILING: A LITTLE
HELP NEEDED FOR BATHING: A LITTLE
DRESSING REGULAR UPPER BODY CLOTHING: A LITTLE
CLIMB 3 TO 5 STEPS WITH RAILING: A LITTLE
TOILETING: A LITTLE
DRESSING REGULAR LOWER BODY CLOTHING: A LITTLE
WALKING IN HOSPITAL ROOM: A LITTLE
DAILY ACTIVITIY SCORE: 20
PERSONAL GROOMING: A LITTLE
STANDING UP FROM CHAIR USING ARMS: A LITTLE

## 2024-01-29 ASSESSMENT — PAIN - FUNCTIONAL ASSESSMENT
PAIN_FUNCTIONAL_ASSESSMENT: 0-10

## 2024-01-29 ASSESSMENT — PAIN SCALES - GENERAL
PAINLEVEL_OUTOF10: 0 - NO PAIN
PAINLEVEL_OUTOF10: 2
PAINLEVEL_OUTOF10: 3
PAINLEVEL_OUTOF10: 0 - NO PAIN
PAINLEVEL_OUTOF10: 1
PAINLEVEL_OUTOF10: 0 - NO PAIN
PAINLEVEL_OUTOF10: 4
PAINLEVEL_OUTOF10: 1
PAINLEVEL_OUTOF10: 2
PAINLEVEL_OUTOF10: 1
PAINLEVEL_OUTOF10: 0 - NO PAIN
PAINLEVEL_OUTOF10: 2

## 2024-01-29 ASSESSMENT — PAIN DESCRIPTION - DESCRIPTORS: DESCRIPTORS: DISCOMFORT

## 2024-01-29 ASSESSMENT — PAIN DESCRIPTION - LOCATION: LOCATION: ABDOMEN

## 2024-01-29 ASSESSMENT — PAIN DESCRIPTION - ORIENTATION: ORIENTATION: LOWER

## 2024-01-29 NOTE — ANESTHESIA POSTPROCEDURE EVALUATION
Patient: Eze Damian    Procedure Summary       Date: 01/29/24 Room / Location: [facility] OR 04 / Virtual [facility] OR    Anesthesia Start: 1146 Anesthesia Stop: 1343    Procedure: Laparoscopic Right Extended Colectomy Diagnosis:       Malignant neoplasm of hepatic flexure (CMS/HCC)      (Malignant neoplasm of hepatic flexure (CMS/HCC) [C18.3])    Surgeons: Mona Hernandez MD Responsible Provider: Jamal Cintron MD    Anesthesia Type: general ASA Status: 3            Anesthesia Type: general    Vitals Value Taken Time   /82 01/29/24 1416   Temp  01/29/24 1422   Pulse 97 01/29/24 1422   Resp  01/29/24 1422   SpO2 95 % 01/29/24 1422   Vitals shown include unvalidated device data.    Anesthesia Post Evaluation    Patient participation: complete - patient participated  Level of consciousness: awake  Pain management: adequate  Airway patency: patent  Cardiovascular status: acceptable and hemodynamically stable  Respiratory status: acceptable  Hydration status: acceptable  Postoperative Nausea and Vomiting: none        No notable events documented.

## 2024-01-29 NOTE — ANESTHESIA PREPROCEDURE EVALUATION
Patient: Eze Damian    Procedure Information       Date/Time: 01/29/24 1130    Procedure: Laparoscopic Right Extended Colectomy    Location: [facility] OR 04 / [facility] OR    Surgeons: Mona Hernandez MD          54 yo M hx adenoCA, anemia (hgb 8.7), GERD (controlled), HTN.    Relevant Problems   Cardiovascular   (+) High blood pressure   (+) Hyperlipidemia      GI   (+) Gastroesophageal reflux disease   (+) Malignant neoplasm of hepatic flexure (CMS/HCC)      GI/Hepatic   (+) Malignant neoplasm of hepatic flexure (CMS/HCC)       Clinical information reviewed:   Tobacco  Allergies  Meds   Med Hx  Surg Hx   Fam Hx  Soc Hx        NPO Detail:  NPO/Void Status  Carbohydrate Drink Given Prior to Surgery? : N  Date of Last Liquid: 01/29/24  Time of Last Liquid: 0800  Date of Last Solid: 01/27/24  Time of Last Solid: 2000  Last Intake Type: Clear fluids         Physical Exam    Airway  Mallampati: III  TM distance: >3 FB  Comments: Thick neck   Cardiovascular   Rate: normal     Dental    Pulmonary - normal exam     Abdominal        Vitals:    01/29/24 0951   BP: 140/77   Pulse: 97   Resp: 16   Temp:    SpO2: 97%       Past Surgical History:   Procedure Laterality Date   • COLONOSCOPY W/ BIOPSIES  12/09/2023   • KNEE CARTILAGE SURGERY Left 1992   • TONSILLECTOMY       Past Medical History:   Diagnosis Date   • Anemia     H/H 6.7/28 1/11/24   • Colon cancer (CMS/HCC)     new diagnosis, 12/2023   • GERD (gastroesophageal reflux disease)    • Hyperlipidemia    • Hypertension    • Tobacco use disorder, mild, in early remission 12/2023    Quit chewing tobacco       Current Facility-Administered Medications:   •  ceFAZolin in dextrose (iso-os) (Ancef) IVPB 2 g, 2 g, intravenous, Once, Janay Dee, APRN-CNP    Facility-Administered Medications Ordered in Other Encounters:   •  lactated Ringer's infusion, , intravenous, Continuous PRN, JEANNA Blanco, New Bag at 01/29/24 1118  Prior to Admission medications     Medication Sig Start Date End Date Taking? Authorizing Provider   amLODIPine (Norvasc) 10 mg tablet Take 1 tablet (10 mg) by mouth once daily. 10/31/23 10/30/24 Yes Brant Nieves MD   atorvastatin (Lipitor) 20 mg tablet Take 1 tablet (20 mg) by mouth once daily. 10/31/23 10/30/24 Yes Brant Nieves MD   cyanocobalamin, vitamin B-12, 1,000 mcg tablet, sublingual Place 2 tablets (2,000 mcg) under the tongue once daily. 1/12/24 3/12/24 Yes Mona Hernandez MD   ferrous sulfate 325 (65 Fe) MG EC tablet Take 2 tablets by mouth once daily with breakfast. Do not crush, chew, or split. 1/10/24 1/9/25 Yes Mona Hernandez MD   NATURAL BEE POLLEN ORAL Take 1 capsule by mouth every other day.   Yes Historical Provider, MD   gabapentin (Neurontin) 100 mg capsule Take one tablet at bedtime 3 nights before surgery 1/15/24 1/29/24 Yes ARCENIO Joshi   GaviLyte-G 236-22.74-6.74 -5.86 gram solution MIX AND DRINK 1/2 STARTING 6 PM THE NGIHT BEFORE PROCEDURE THEN OTHER HALF 5 HOURS BEFORE PROCEDURE 11/14/23 1/29/24 Yes Historical Provider, MD   metroNIDAZOLE (Flagyl) 250 mg tablet Take one tablet at 6PM, 7PM,and 11PM the day before surgery 1/15/24 1/29/24 Yes ARCENIO Joshi   neomycin (Mycifradin) 500 mg tablet Take two tablets (1000 mg) by mouth at 3:00pm, 4:00pm and at bed time on the day prior to surgery 1/15/24 1/29/24 Yes ARCENIO Joshi   chlorhexidine (Peridex) 0.12 % solution Use 15 mL in the mouth or throat once daily for 2 days. 1/23/24 1/25/24  ARCENIO Aburto   pantoprazole (ProtoNix) 40 mg EC tablet Take 1 tablet (40 mg) by mouth once daily. Do not crush, chew, or split. 1/8/24 1/29/24  Brant Nieves MD     No Known Allergies  Social History     Tobacco Use   • Smoking status: Never   • Smokeless tobacco: Former     Types: Chew     Quit date: 12/2023   • Tobacco comments:     2 small pouches a day x 4-5 years   Substance Use Topics   • Alcohol use: Yes     Comment:  "drinks oeygsgmd-4-6 drinks a month         Chemistry    Lab Results   Component Value Date/Time     (L) 01/23/2024 1552    K 4.9 01/23/2024 1552    CL 97 (L) 01/23/2024 1552    CO2 25 01/23/2024 1552    BUN 18 01/23/2024 1552    CREATININE 1.08 01/23/2024 1552    Lab Results   Component Value Date/Time    CALCIUM 9.2 01/23/2024 1552    ALKPHOS 110 01/23/2024 1552    AST 16 01/23/2024 1552    ALT 19 01/23/2024 1552    BILITOT 0.5 01/23/2024 1552          Lab Results   Component Value Date/Time    WBC 17.9 (H) 01/23/2024 1552    HGB 8.7 (L) 01/23/2024 1552    HCT 35.1 (L) 01/23/2024 1552     (H) 01/23/2024 1552     No results found for: \"PROTIME\", \"PTT\", \"INR\"  Encounter Date: 10/31/23   ECG 12 lead (Clinic Performed)    Narrative    EKG done shows sinus arrhythmia at 93 bpm with possible left atrial   enlargement without any ischemic changes     No results found for this or any previous visit from the past 1095 days.    Anesthesia Plan    History of general anesthesia?: yes  History of complications of general anesthesia?: no    ASA 3     general     The patient is not a current smoker.    intravenous induction   Postoperative administration of opioids is intended.  Anesthetic plan and risks discussed with patient.    Plan discussed with attending.    "

## 2024-01-29 NOTE — ANESTHESIA PROCEDURE NOTES
Airway  Date/Time: 1/29/2024 12:01 PM  Urgency: elective    Difficult airway    Staffing  Performed: JEANNA   Authorized by: Jamal Cintron MD    Performed by: JEANNA Blanco  Patient location during procedure: OR    Indications and Patient Condition  Indications for airway management: anesthesia  Spontaneous Ventilation: absent  Sedation level: deep  Preoxygenated: yes  Mask difficulty assessment: 3 - difficult mask (inadequate, unstable or two providers) +/- NMBA    Final Airway Details  Final airway type: endotracheal airway      Successful airway: ETT  Cuffed: yes   Successful intubation technique: video laryngoscopy  Blade size: #4  ETT size (mm): 7.0  Cormack-Lehane Classification: grade I - full view of glottis  Placement verified by: chest auscultation and capnometry   Measured from: lips  ETT to lips (cm): 22  Number of attempts at approach: 2  Ventilation between attempts: 2 hand mask    Additional Comments  Large Oral airway with two hand mask .  Grade 2/3 view with pozo 2 blade.  Decision to use glidescope to facilitate easier placement.  Grade 1 view with Glidescope

## 2024-01-29 NOTE — PERIOPERATIVE NURSING NOTE
Patient transported to room 714 via cart with transport.   Pt is in stable condition and on room air, no s/s or c/o pain/discomfort.  All VSS, report sent, family updated.    Michelle Robertson RN

## 2024-01-29 NOTE — OP NOTE
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]     Procedures  Laparoscopic Right Extended Colectomy  90512 - MN COLCT TOT ABDL W/O PRCTECT W/ILEOST/ILEOPXTS      Surgeons      * Mona Hernandez - Primary    Resident/Fellow/Other Assistant:  Surgeon(s) and Role:    Procedure Summary  Anesthesia: General  ASA: III  Anesthesia Staff: Anesthesiologist: Jamal Cintron MD  C-AA: JEANNA Blanco  Estimated Blood Loss: 25mL  Intra-op Medications:   Administrations occurring from 1130 to 1400 on 24:   Medication Name Total Dose   BUPivacaine HCl (Marcaine) 0.5 % (5 mg/mL) 30 mL in sodium chloride 0.9% 30 mL syringe 49 mL              Anesthesia Record               Intraprocedure I/O Totals          Intake    LR infusion 900.00 mL    Total Intake 900 mL       Output    Est. Blood Loss 25 mL    Total Output 25 mL       Net    Net Volume 875 mL          Specimen:   ID Type Source Tests Collected by Time   1 : RIGHT HEMICOLECTOMY EXTENDED Tissue COLON - RIGHT HEMICOLECTOMY SURGICAL PATHOLOGY EXAM Mona Hernandez MD 2024 1246        Staff:   Circulator: Alida Sahu RN; Janice Lemus RN  Relief Scrub: Tushar Mai  Scrub Person: Tami Gutiérrez         Drains and/or Catheters: * None in log *      Findings: No metastatic disease, normal appearing spleen    Indications: Eze Damian is an 53 y.o. 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 Hepatic flexure tumor    The patient was seen in the preoperative area. The risks, benefits, complications, treatment options, non-operative alternatives, expected recovery and outcomes were discussed with the patient.  The possibilities of reaction to medication, pulmonary aspiration, injury to surrounding structures, bleeding, recurrent infection, the need for additional procedures, failure to diagnose a condition, and creating a complication requiring transfusion or operation were discussed with the patient. The patient concurred with the proposed plan, giving informed consent.  The site of surgery was properly noted/marked if necessary per policy. The patient has been actively warmed in preoperative area. Preoperative antibiotics have been ordered and given within 1 hours of incision. Venous thrombosis prophylaxis have been ordered including bilateral sequential compression devices and chemical prophylaxis    Procedure Details:Patient was brought to to the operating room, huddle was performed all were in agreement, they were moved to the table in the supine split leg position, and general endotracheal anesthesia was induced.  IV antibiotics and a heparin shot were administered and they were prepped using ChloraPrep which was allowed to dry for 3 minutes and taped and draped in the usual sterile fashion.      A 4 cm incision was made through the umbilicus using the knife.  The subcutaneous tissues were divided using Bovie cautery and the fascia was dissected using the knife a finger sweep was performed there was no peritoneal carcinomatosis and the gel point wound protector was placed using 2-10s and 1-12 mm port.  There was no evidence of peritoneal carcinomatosis and the liver appeared completely normal. Tattoo was noted at the hepatic flexure.      Ligament of Treves was then grasped and the plane between the ileocolic vessels and the posterior aspect of the mesentery was dissected out the duodenum was identified and swept down out of harm's way and medial to lateral approach was used to take the posterior aspect of the mesentery off the retroperitoneum up to the right upper quadrant.  The ileocolic vessels were dissected out  at the SMA and SMV.  These were taken using the LigaSure the mesentery was dissected along the SMA and SMV up to the middle colic's which were taken from below.      The terminal ileum was retracted cephalad and the 2 dissections were joined the lateral attachments were taken up to the right upper quadrant the hepatocolic attachments were taken down with ease.    We then transected the omentum on the left side of the transverse colon and dissected the omentum off the distal transverse colon and retracted that cephalad the lesser sac was entered. We then resected the lesser omentum toward the right colon through the lesser sac to ensure that we would take omentum with the specimen.  We then were able to join the 2 dissections and peeled the posterior stomach off the transverse colon omentum.  We then found our prior dissection from below and took the middle colic's in a high ligation and were able to dissect the transverse colon mesentery along the border of the pancreas and over the ligament of Treitz.     The terminal ileum was grasped and brought out through the wound protector.  With difficulty were able to get the right colon and the tumor out through the wound protector.  The mesentery of the terminal ileum was transected using the LigaSure up to the TI which was transected using Bainbridge Kocher and a knife.  The mesentery of the distal transverse colon was taken using the LigaSure at least 10 cm from the tumor up to the colon this was cut and we had aggressive pulsatile flow from the distal lumen.  The colon was transected using Bainbridge Kocher and a knife and the specimen was sent as extended right hemicolectomy.    An endo to side ileocolic anastomosis was created. An 0 Prolene suture was used to create a pursestring around a 31 EEA stapler anvil in the terminal ileum.  The stapler was brought out through the antimesenteric tinea of the descending colon the stapler was docked and fired there were 2  complete donuts.  Both lumens were checked and ensured to be open.  The open colon lumen was transected using a TX 60 blue load stapler this was oversewn using interrupted figure-of-eight 3-0 Vicryl sutures and had good pulsatile flow.  The bowel was returned to the peritoneal cavity, and we went back in laparoscopically.  Any blood products were suctioned out, a four-quadrant tap block was performed instilling 10 cc of quarter percent Marcaine in the right upper quadrant right lower quadrant left upper quadrant left lower quadrant under direct vision.  Mesenteric orientation was ensured to be correct, and a clean closure was performed.    The fascia was closed using interrupted figure-of-eight 0 PDS sutures. The skin was irrigated out and closed down using 3-0 Vicryl sutures and the skin was closed using 4-0 Monocryl and covered using LiquiBand  Complications:  None; patient tolerated the procedure well.    Disposition: PACU - hemodynamically stable.  Condition: stable         Attending Attestation: I was present for the entire procedure.    Mona Hernandez  Phone Number: [phone]

## 2024-01-30 ENCOUNTER — APPOINTMENT (OUTPATIENT)
Dept: CARDIOLOGY | Facility: HOSPITAL | Age: 54
DRG: 330 | End: 2024-01-30
Payer: COMMERCIAL

## 2024-01-30 LAB
ANION GAP SERPL CALC-SCNC: 15 MMOL/L (ref 10–20)
BUN SERPL-MCNC: 15 MG/DL (ref 6–23)
CALCIUM SERPL-MCNC: 8.8 MG/DL (ref 8.6–10.3)
CHLORIDE SERPL-SCNC: 102 MMOL/L (ref 98–107)
CO2 SERPL-SCNC: 23 MMOL/L (ref 21–32)
CREAT SERPL-MCNC: 1.03 MG/DL (ref 0.5–1.3)
EGFRCR SERPLBLD CKD-EPI 2021: 87 ML/MIN/1.73M*2
ERYTHROCYTE [DISTWIDTH] IN BLOOD BY AUTOMATED COUNT: 30.3 % (ref 11.5–14.5)
GLUCOSE SERPL-MCNC: 137 MG/DL (ref 74–99)
HCT VFR BLD AUTO: 32.4 % (ref 41–52)
HGB BLD-MCNC: 8.2 G/DL (ref 13.5–17.5)
MCH RBC QN AUTO: 16.9 PG (ref 26–34)
MCHC RBC AUTO-ENTMCNC: 25.3 G/DL (ref 32–36)
MCV RBC AUTO: 67 FL (ref 80–100)
NRBC BLD-RTO: 0 /100 WBCS (ref 0–0)
PLATELET # BLD AUTO: 668 X10*3/UL (ref 150–450)
POTASSIUM SERPL-SCNC: 4.1 MMOL/L (ref 3.5–5.3)
RBC # BLD AUTO: 4.85 X10*6/UL (ref 4.5–5.9)
SODIUM SERPL-SCNC: 136 MMOL/L (ref 136–145)
WBC # BLD AUTO: 22.2 X10*3/UL (ref 4.4–11.3)

## 2024-01-30 PROCEDURE — 36415 COLL VENOUS BLD VENIPUNCTURE: CPT | Performed by: COLON & RECTAL SURGERY

## 2024-01-30 PROCEDURE — 99232 SBSQ HOSP IP/OBS MODERATE 35: CPT | Performed by: REGISTERED NURSE

## 2024-01-30 PROCEDURE — 93005 ELECTROCARDIOGRAM TRACING: CPT

## 2024-01-30 PROCEDURE — 2500000001 HC RX 250 WO HCPCS SELF ADMINISTERED DRUGS (ALT 637 FOR MEDICARE OP): Performed by: REGISTERED NURSE

## 2024-01-30 PROCEDURE — 80048 BASIC METABOLIC PNL TOTAL CA: CPT | Performed by: COLON & RECTAL SURGERY

## 2024-01-30 PROCEDURE — 2500000001 HC RX 250 WO HCPCS SELF ADMINISTERED DRUGS (ALT 637 FOR MEDICARE OP): Performed by: COLON & RECTAL SURGERY

## 2024-01-30 PROCEDURE — 2500000004 HC RX 250 GENERAL PHARMACY W/ HCPCS (ALT 636 FOR OP/ED): Performed by: COLON & RECTAL SURGERY

## 2024-01-30 PROCEDURE — 1100000001 HC PRIVATE ROOM DAILY

## 2024-01-30 PROCEDURE — 85027 COMPLETE CBC AUTOMATED: CPT | Performed by: COLON & RECTAL SURGERY

## 2024-01-30 PROCEDURE — 9420000001 HC RT PATIENT EDUCATION 5 MIN

## 2024-01-30 RX ORDER — TRIAMTERENE AND HYDROCHLOROTHIAZIDE 75; 50 MG/1; MG/1
1 TABLET ORAL DAILY
COMMUNITY
End: 2024-04-25 | Stop reason: SDUPTHER

## 2024-01-30 RX ORDER — SIMETHICONE 80 MG
40 TABLET,CHEWABLE ORAL 4 TIMES DAILY
Status: DISCONTINUED | OUTPATIENT
Start: 2024-01-30 | End: 2024-01-31 | Stop reason: HOSPADM

## 2024-01-30 RX ADMIN — GABAPENTIN 300 MG: 300 CAPSULE ORAL at 08:38

## 2024-01-30 RX ADMIN — SIMETHICONE 40 MG: 80 TABLET, CHEWABLE ORAL at 12:00

## 2024-01-30 RX ADMIN — ACETAMINOPHEN 650 MG: 325 TABLET ORAL at 14:53

## 2024-01-30 RX ADMIN — ATORVASTATIN CALCIUM 20 MG: 20 TABLET, FILM COATED ORAL at 20:49

## 2024-01-30 RX ADMIN — GABAPENTIN 300 MG: 300 CAPSULE ORAL at 20:43

## 2024-01-30 RX ADMIN — ACETAMINOPHEN 650 MG: 325 TABLET ORAL at 08:45

## 2024-01-30 RX ADMIN — AMLODIPINE BESYLATE 10 MG: 10 TABLET ORAL at 08:38

## 2024-01-30 RX ADMIN — ENOXAPARIN SODIUM 40 MG: 40 INJECTION SUBCUTANEOUS at 20:43

## 2024-01-30 RX ADMIN — SIMETHICONE 40 MG: 80 TABLET, CHEWABLE ORAL at 17:08

## 2024-01-30 RX ADMIN — SIMETHICONE 40 MG: 80 TABLET, CHEWABLE ORAL at 20:44

## 2024-01-30 RX ADMIN — IRON SUCROSE 100 MG: 20 INJECTION, SOLUTION INTRAVENOUS at 08:38

## 2024-01-30 RX ADMIN — KETOROLAC TROMETHAMINE 15 MG: 30 INJECTION, SOLUTION INTRAMUSCULAR; INTRAVENOUS at 11:48

## 2024-01-30 RX ADMIN — KETOROLAC TROMETHAMINE 15 MG: 30 INJECTION, SOLUTION INTRAMUSCULAR; INTRAVENOUS at 05:58

## 2024-01-30 RX ADMIN — ACETAMINOPHEN 650 MG: 325 TABLET ORAL at 20:45

## 2024-01-30 RX ADMIN — GABAPENTIN 300 MG: 300 CAPSULE ORAL at 14:53

## 2024-01-30 RX ADMIN — ACETAMINOPHEN 650 MG: 325 TABLET ORAL at 03:25

## 2024-01-30 SDOH — HEALTH STABILITY: MENTAL HEALTH: HOW MANY STANDARD DRINKS CONTAINING ALCOHOL DO YOU HAVE ON A TYPICAL DAY?: 1 OR 2

## 2024-01-30 SDOH — SOCIAL STABILITY: SOCIAL INSECURITY
WITHIN THE LAST YEAR, HAVE TO BEEN RAPED OR FORCED TO HAVE ANY KIND OF SEXUAL ACTIVITY BY YOUR PARTNER OR EX-PARTNER?: NO

## 2024-01-30 SDOH — ECONOMIC STABILITY: FOOD INSECURITY: WITHIN THE PAST 12 MONTHS, THE FOOD YOU BOUGHT JUST DIDN'T LAST AND YOU DIDN'T HAVE MONEY TO GET MORE.: NEVER TRUE

## 2024-01-30 SDOH — ECONOMIC STABILITY: TRANSPORTATION INSECURITY
IN THE PAST 12 MONTHS, HAS THE LACK OF TRANSPORTATION KEPT YOU FROM MEDICAL APPOINTMENTS OR FROM GETTING MEDICATIONS?: NO

## 2024-01-30 SDOH — SOCIAL STABILITY: SOCIAL NETWORK: ARE YOU MARRIED, WIDOWED, DIVORCED, SEPARATED, NEVER MARRIED, OR LIVING WITH A PARTNER?: SEPARATED

## 2024-01-30 SDOH — ECONOMIC STABILITY: FOOD INSECURITY: WITHIN THE PAST 12 MONTHS, YOU WORRIED THAT YOUR FOOD WOULD RUN OUT BEFORE YOU GOT MONEY TO BUY MORE.: NEVER TRUE

## 2024-01-30 SDOH — SOCIAL STABILITY: SOCIAL INSECURITY: WITHIN THE LAST YEAR, HAVE YOU BEEN AFRAID OF YOUR PARTNER OR EX-PARTNER?: NO

## 2024-01-30 SDOH — ECONOMIC STABILITY: INCOME INSECURITY: IN THE LAST 12 MONTHS, WAS THERE A TIME WHEN YOU WERE NOT ABLE TO PAY THE MORTGAGE OR RENT ON TIME?: NO

## 2024-01-30 SDOH — SOCIAL STABILITY: SOCIAL INSECURITY: WITHIN THE LAST YEAR, HAVE YOU BEEN HUMILIATED OR EMOTIONALLY ABUSED IN OTHER WAYS BY YOUR PARTNER OR EX-PARTNER?: NO

## 2024-01-30 SDOH — ECONOMIC STABILITY: HOUSING INSECURITY
IN THE LAST 12 MONTHS, WAS THERE A TIME WHEN YOU DID NOT HAVE A STEADY PLACE TO SLEEP OR SLEPT IN A SHELTER (INCLUDING NOW)?: NO

## 2024-01-30 SDOH — HEALTH STABILITY: MENTAL HEALTH: HOW OFTEN DO YOU HAVE 6 OR MORE DRINKS ON ONE OCCASION?: NEVER

## 2024-01-30 SDOH — ECONOMIC STABILITY: INCOME INSECURITY: IN THE PAST 12 MONTHS, HAS THE ELECTRIC, GAS, OIL, OR WATER COMPANY THREATENED TO SHUT OFF SERVICE IN YOUR HOME?: NO

## 2024-01-30 SDOH — ECONOMIC STABILITY: TRANSPORTATION INSECURITY
IN THE PAST 12 MONTHS, HAS LACK OF TRANSPORTATION KEPT YOU FROM MEETINGS, WORK, OR FROM GETTING THINGS NEEDED FOR DAILY LIVING?: NO

## 2024-01-30 SDOH — ECONOMIC STABILITY: INCOME INSECURITY: HOW HARD IS IT FOR YOU TO PAY FOR THE VERY BASICS LIKE FOOD, HOUSING, MEDICAL CARE, AND HEATING?: NOT HARD AT ALL

## 2024-01-30 SDOH — HEALTH STABILITY: MENTAL HEALTH: HOW OFTEN DO YOU HAVE A DRINK CONTAINING ALCOHOL?: 2-4 TIMES A MONTH

## 2024-01-30 SDOH — SOCIAL STABILITY: SOCIAL INSECURITY
WITHIN THE LAST YEAR, HAVE YOU BEEN KICKED, HIT, SLAPPED, OR OTHERWISE PHYSICALLY HURT BY YOUR PARTNER OR EX-PARTNER?: NO

## 2024-01-30 SDOH — ECONOMIC STABILITY: HOUSING INSECURITY: IN THE LAST 12 MONTHS, HOW MANY PLACES HAVE YOU LIVED?: 1

## 2024-01-30 ASSESSMENT — LIFESTYLE VARIABLES
AUDIT-C TOTAL SCORE: 2
SKIP TO QUESTIONS 9-10: 1

## 2024-01-30 ASSESSMENT — PAIN SCALES - GENERAL
PAINLEVEL_OUTOF10: 2
PAINLEVEL_OUTOF10: 0 - NO PAIN
PAINLEVEL_OUTOF10: 3
PAINLEVEL_OUTOF10: 3

## 2024-01-30 ASSESSMENT — COGNITIVE AND FUNCTIONAL STATUS - GENERAL
MOBILITY SCORE: 22
PERSONAL GROOMING: A LITTLE
HELP NEEDED FOR BATHING: A LITTLE
DRESSING REGULAR UPPER BODY CLOTHING: A LITTLE
WALKING IN HOSPITAL ROOM: A LITTLE
DAILY ACTIVITIY SCORE: 24
TOILETING: A LITTLE
MOBILITY SCORE: 24
DRESSING REGULAR LOWER BODY CLOTHING: A LITTLE
CLIMB 3 TO 5 STEPS WITH RAILING: A LITTLE
DAILY ACTIVITIY SCORE: 19

## 2024-01-30 ASSESSMENT — PAIN - FUNCTIONAL ASSESSMENT
PAIN_FUNCTIONAL_ASSESSMENT: 0-10

## 2024-01-30 ASSESSMENT — ACTIVITIES OF DAILY LIVING (ADL): LACK_OF_TRANSPORTATION: NO

## 2024-01-30 NOTE — DISCHARGE INSTRUCTIONS
Instructions After Laparoscopic Surgery    Please take your home iron supplement (ferrous sulfate) as directed.    Wound Care:  - Ice packs to wounds every hour the first day  - Ok to shower 24 hours after surgery  - No baths or swimming for 2 weeks  - Keep wounds clean and dry  - Do not apply topical creams or ointments  - Call if you notice redness around wound, foul-smelliing drainage, or increasing pain     Diet:          - GI soft, low residual as discussed with Dietary          - Ensure Surgery Shakes 3 times a day for 5 days    Activity          - Take it easy for the first 48 hours          - Stairs and walks are fine          - Resume activities gradually over the first week          - Ask Dr Hernandez before resuming strenuous physical exertions          - No driving while on pain medication    Medications          - Pain medicine prescription attached for severe pain          - Please take Motrin/ Ibuprofen 600 mg every 6 hours scheduled for 2-3 days, then every 6 hours as needed          - Please take Tylenol 625 mg every 6 hours scheduled for 2-3 days, then every 6 hours as needed          - Resume your home medications unless otherwise directed          - Oxycodone 5mg every 6 hours as needed for pain    Other Instructions          - Call to make appointment within 1-2 days: [phone], if one has not already been made for you          - Call the doctor for the following:   Severe unrelieved pain   Fevers > 101F   Nausea/vomiting   Wound issues   Insurance/return to work forms   Shortness of breath   Chest pains    Other Instructions:  It is important to drink adequate fluid and avoid dehydration. Signs of dehydration include dark urine, decreased frequency in urine, pale mucous membrane, or dry mucous membranes. You should drink at least 8 8oz glasses of fluids a day and it should be a variety of fluids (water, juice, tea, coffee, etc.).  If you start to experience nausea, emesis, fever, or abdominal  pain please call Dr. Hernandez's office [phone].

## 2024-01-30 NOTE — CARE PLAN
The patient's goals for the shift include      The clinical goals for the shift include safety, pain    Over the shift, the patient did not make progress toward the following goals. Barriers to progression include  Problem: Pain  Goal: My pain/discomfort is manageable  Outcome: Progressing     Problem: Safety  Goal: Patient will be injury free during hospitalization  Outcome: Progressing  Goal: I will remain free of falls  Outcome: Progressing     Problem: Daily Care  Goal: Daily care needs are met  Outcome: Progressing     Problem: Psychosocial Needs  Goal: Demonstrates ability to cope with hospitalization/illness  Outcome: Progressing  Goal: Collaborate with me, my family, and caregiver to identify my specific goals  Outcome: Progressing     Problem: Discharge Barriers  Goal: My discharge needs are met  Outcome: Progressing     Problem: Discharge Barriers  Goal: My discharge needs are met  Outcome: Progressing   . Recommendations to address these barriers include .

## 2024-01-30 NOTE — POST-PROCEDURE NOTE
Mr. Damian is a 53 year old male who is POD #0 from a laparoscopic right extended colectomy (Intraoperative Findings: No metastatic disease, normal appearing spleen). He is feeling well post-operatively. He does endorse abdominal soreness, but pain is well controlled. He denies any N/V, fevers or chills. He has not urinated since OR. He has not passed gas/had a BM since OR.    PE:  Constitutional: A&Ox3, calm and cooperative, NAD.  Eyes: PERRL, clear sclera.  ENMT: Moist mucous membranes.  Head/Neck: Neck supple.  Cardiovascular: Normal rate and regular rhythm.   Respiratory/Thorax: Unlabored breathing.  Gastrointestinal: Abdomen slightly distended, soft, appropriately tender to palpation, no peritoneal signs, laparotomy site c/d/i, well approximated with Dermabond, no erythema or drainage.  Genitourinary: Awaiting post-op urinary void.  Musculoskeletal: ROM intact.  Neurological: A&Ox3, No focal deficits.  Psychological: Appropriate mood and behavior.  Skin: Warm and dry.    Radiology and labs reviewed. VSS, afebrile.    Plan:   - Diet: Low Fiber. Ensure Surgery (Smith) TID x5 days.  - Chewing gum  - IVF  - Continue current pain regimen   - PRN antiemetic   - Continue Venofer  - DVT Proph: SCDs/ ambulate/ Lovenox  - Monitor VS every 4 hours   - Labs ordered for AM   - IS every hour while awake   - Encourage ambulation / OOB as tolerated   - Monitor lap sites for drainage, erythema, excessive bruising   - Daily weights  - Dietician consulted  - F/u with Dr. Hernandez outpatient once d/c from hospital     Dispo: Pain control. Awaiting post-op urinary void. OOB as able. Awaiting RBF.

## 2024-01-30 NOTE — PROGRESS NOTES
01/30/24 1343   Discharge Planning   Living Arrangements Alone   Support Systems Family members   Assistance Needed none   Type of Residence Private residence   Number of Stairs to Enter Residence 0   Number of Stairs Within Residence 12   Do you have animals or pets at home? No   Home or Post Acute Services None   Patient expects to be discharged to: home   Does the patient need discharge transport arranged? No   Financial Resource Strain   How hard is it for you to pay for the very basics like food, housing, medical care, and heating? Not hard   Housing Stability   In the last 12 months, was there a time when you were not able to pay the mortgage or rent on time? N   In the last 12 months, how many places have you lived? 1   In the last 12 months, was there a time when you did not have a steady place to sleep or slept in a shelter (including now)? N   Transportation Needs   In the past 12 months, has lack of transportation kept you from medical appointments or from getting medications? no   In the past 12 months, has lack of transportation kept you from meetings, work, or from getting things needed for daily living? No     1/30/24 6242  Met with patient at bedside to discuss discharge planning.  Patient lives at home in a condo alone.  He is independent with ADLs and drives at baseline.  He does not use any assistive devices for ambulation.  He plans on returning home at discharge and does not anticipate any discharge needs.  He will notify the [facility] should any needs arise.  Idalmis Villarreal RN [facility]

## 2024-01-30 NOTE — CARE PLAN
The patient's goals for the shift include      The clinical goals for the shift include safety    Problem: Pain  Goal: My pain/discomfort is manageable  Outcome: Progressing

## 2024-01-30 NOTE — CONSULTS
"Nutrition Education Note  Nutrition Assessment      Reason for Assessment  Reason for Assessment: Provider consult order Minimal residue/low fiber diet education    Chart reviewed and pt visited.    Eze is a 53 y.o. male admitted for malignant neoplasm of hepatic flexure.    RD discussed minimal residue/low fiber diet education with pt and family.  Left handout with family.    Dietary Orders (From admission, onward)       Start     Ordered    01/29/24 1533  Adult diet Low fiber  Diet effective now        Question:  Diet type  Answer:  Low fiber    01/29/24 1532    01/29/24 1533  Oral Supplements - Ensure Surgery  Until discontinued        Comments: Drink 3 cartons daily for 5 days. Nutrition services please send 15 cartons ONE TIME only.   Question:  Select supplement:  Answer:  Smith    01/29/24 1532                  History:     Past Medical History:   Diagnosis Date    Anemia     H/H 6.7/28 1/11/24    Colon cancer (CMS/HCC)     new diagnosis, 12/2023    GERD (gastroesophageal reflux disease)     Hyperlipidemia     Hypertension     Tobacco use disorder, mild, in early remission 12/2023    Quit chewing tobacco     Anthropometrics:  Height: 180.3 cm (5' 10.98\")  Weight: 91.9 kg (202 lb 11.2 oz)  BMI (Calculated): 28.28    Weight Change: 0    Wt Readings from Last 5 Encounters:   01/30/24 91.9 kg (202 lb 11.2 oz)   01/23/24 91.4 kg (201 lb 8 oz)   01/10/24 92.5 kg (204 lb)   11/02/23 93.9 kg (207 lb)   10/31/23 93.9 kg (207 lb)     Weight Change  Significant Weight Loss: No     IBW/kg (Dietitian Calculated): 78.2 kg  Percent of IBW: 118 %     Nutrition Focused Physical Findings:     Physical Findings (Nutrition Deficiency/Toxicity)  Skin: Positive (Incision- abdominal)       Education Documentation  Nutrition Care Manual, taught by Danika Cross RDN, LD at 1/30/2024 11:48 AM.  Learner: Family, Patient  Readiness: Acceptance  Method: Explanation, Handout  Response: Verbalizes Understanding  RD discussed minimal " residue/low fiber diet education with pt and family. Discussed recommended foods (like white pastas, rice, breads, potatoes (no skins), creamy smooth pb, well cooked vegetables, some canned fruits) and foods that are recommended to avoid (like whole grains, tough/fibrous meats, raw vegetables, nuts, seeds).  Left minimal residue/low fiber diet handout with family.       Follow Up  Time Spent (min): 30 minutes  Last Date of Nutrition Visit: 01/30/24  Nutrition Follow-Up Needed?: Dietitian to reassess per policy  Follow up Comment: Redd- diet education

## 2024-01-30 NOTE — PROGRESS NOTES
"Eze Damian is a 53 y.o. male on day 1 of admission presenting with Malignant neoplasm of hepatic flexure (CMS/HCC).    Subjective   NAEO. Patient awake, sitting up in bed, NAD. Having some abdominal discomfort, but no pain. Feeling a little bloated. Was able to eat dinner last night and has breakfast at bedside. Denies any SOB, N/V. Using IS ~1L.       Objective     Physical Exam  Constitutional:       Appearance: Normal appearance.   HENT:      Mouth/Throat:      Mouth: Mucous membranes are moist.   Eyes:      Pupils: Pupils are equal, round, and reactive to light.   Cardiovascular:      Rate and Rhythm: Normal rate.      Pulses: Normal pulses.   Pulmonary:      Effort: Pulmonary effort is normal.      Breath sounds: Normal breath sounds.   Abdominal:      Comments: Soft, moderately distended, nontender, Lap sites with Dermabond, C/D/I, edges well approximated, minor bruising without drainage or hematoma.     Musculoskeletal:         General: Normal range of motion.   Skin:     Capillary Refill: Capillary refill takes less than 2 seconds.   Neurological:      General: No focal deficit present.      Mental Status: He is alert.   Psychiatric:         Mood and Affect: Mood normal.         Last Recorded Vitals  Blood pressure 137/74, pulse 91, temperature 37.2 °C (98.9 °F), temperature source Temporal, resp. rate 18, height 1.803 m (5' 11\"), weight 91.9 kg (202 lb 11.2 oz), SpO2 95 %.  Intake/Output last 3 Shifts:  I/O last 3 completed shifts:  In: 1636.7 (17.8 mL/kg) [P.O.:340; I.V.:1296.7 (14.1 mL/kg)]  Out: 1025 (11.1 mL/kg) [Urine:1000 (0.3 mL/kg/hr); Blood:25]  Weight: 91.9 kg     Relevant Results  Results for orders placed or performed during the hospital encounter of 01/29/24 (from the past 24 hour(s))   VERIFY ABO/Rh Group Test   Result Value Ref Range    ABO TYPE O     Rh TYPE POS    Basic Metabolic Panel   Result Value Ref Range    Glucose 137 (H) 74 - 99 mg/dL    Sodium 136 136 - 145 mmol/L    Potassium " 4.1 3.5 - 5.3 mmol/L    Chloride 102 98 - 107 mmol/L    Bicarbonate 23 21 - 32 mmol/L    Anion Gap 15 10 - 20 mmol/L    Urea Nitrogen 15 6 - 23 mg/dL    Creatinine 1.03 0.50 - 1.30 mg/dL    eGFR 87 >60 mL/min/1.73m*2    Calcium 8.8 8.6 - 10.3 mg/dL   CBC   Result Value Ref Range    WBC 22.2 (H) 4.4 - 11.3 x10*3/uL    nRBC 0.0 0.0 - 0.0 /100 WBCs    RBC 4.85 4.50 - 5.90 x10*6/uL    Hemoglobin 8.2 (L) 13.5 - 17.5 g/dL    Hematocrit 32.4 (L) 41.0 - 52.0 %    MCV 67 (L) 80 - 100 fL    MCH 16.9 (L) 26.0 - 34.0 pg    MCHC 25.3 (L) 32.0 - 36.0 g/dL    RDW 30.3 (H) 11.5 - 14.5 %    Platelets 668 (H) 150 - 450 x10*3/uL            Assessment/Plan   Principal Problem:    Malignant neoplasm of hepatic flexure (CMS/HCC)    53 YOM POD #1 laparoscopic right extended colectomy (Intraoperative Findings: No metastatic disease, normal appearing spleen)    Plan:    Neuro: Post-op pain  - OOB/ ambulate 5x per day   - Scheduled tylenol, PRN oxy 5/10, Dilaudid BT, Toradol 15mg q6h x10 doses  - Continue gabapentin 300mg TID    CV:  PMHx: HTN  - Continue amlodipine 10mg daily, atorvastatin 20mg daily  - VS every 4 hours     Pulm: RA, NAD  - IS every hour while awake   - Pulse ox every 4 hours with VS     GI: POD #1 lap R colectomy  - Low fiber  - Ensure Surgery TID X5 days  - Dietitian consulted   - Chew gum  - PRN antiemetic   - Monitor lap sites for drainage, erythema, excessive bruising   - ARBF, no flatus or BM yet    :   - Monitor I&Os     HEME: ABLA 2/2 surgery, DVT Proph  - Hgb 8.7>8.2, trend CBC daily  - SCDs/ ambulate/LVX  - Continue venofer x5 days    ID: Leukocytosis 2/2 post-op  - WBC 22.2, trend CBC  - Monitor for s/s infection  - Afebrile      Dispo: ARBF, OOB/ambulate. Discharge possible later today or tomorrow.     I spent 35 minutes in the professional and overall care of this patient.      Marysol Coronado, APRN-CNP    Pt doing great - no pain, having BM's with stool and blood  Gen - well appearing  Abd - softly  distended, wound CDI  Extr - no edema    Impression - Pt with some bloating and blood DE   Chronic anemia     Plan   IV iron   Recheck CBC in AM   Watch closely - continue fluids for now  Stop toradol

## 2024-01-30 NOTE — PROGRESS NOTES
Pharmacy Medication History Review    Eze Damian is a 53 y.o. male admitted for Malignant neoplasm of hepatic flexure (CMS/HCC). Pharmacy reviewed the patient's [id number] medications and allergies for accuracy.    The list below reflectives the updated PTA list. Please review each medication in order reconciliation for additional clarification and justification.  Medications Prior to Admission   Medication Sig Dispense Refill Last Dose    amLODIPine (Norvasc) 10 mg tablet Take 1 tablet (10 mg) by mouth once daily. (Patient taking differently: Take 1 tablet (10 mg) by mouth once daily at bedtime.) 90 tablet 1 Past Week    atorvastatin (Lipitor) 20 mg tablet Take 1 tablet (20 mg) by mouth once daily. 90 tablet 1 Past Week    cyanocobalamin, vitamin B-12, 1,000 mcg tablet, sublingual Place 2 tablets (2,000 mcg) under the tongue once daily. 60 tablet 1 2024    ferrous sulfate 325 (65 Fe) MG EC tablet Take 2 tablets by mouth once daily with breakfast. Do not crush, chew, or split. 60 tablet 11 Past Week    NATURAL BEE POLLEN ORAL Take 1 capsule by mouth every other day.   Past Week    [] chlorhexidine (Peridex) 0.12 % solution Use 15 mL in the mouth or throat once daily for 2 days. (Patient not taking: Reported on 2024) 30 mL 0 Not Taking    triamterene-hydrochlorothiazid (Maxzide) 75-50 mg tablet Take 1 tablet by mouth once daily.           The list below reflectives the updated allergy list. Please review each documented allergy for additional clarification and justification.  Allergies  Reviewed by Rosalinda Watkins RN on 2024   No Known Allergies         Below are additional concerns with the patient's PTA list.  Prior to Admission Medications   Prescriptions Last Dose Informant Patient Reported? Taking?   NATURAL BEE POLLEN ORAL Past Week  Yes Yes   Sig: Take 1 capsule by mouth every other day.   amLODIPine (Norvasc) 10 mg tablet Past Week  No Yes   Sig: Take 1 tablet (10 mg) by mouth  once daily.   Patient taking differently: Take 1 tablet (10 mg) by mouth once daily at bedtime.   atorvastatin (Lipitor) 20 mg tablet Past Week  No Yes   Sig: Take 1 tablet (20 mg) by mouth once daily.   chlorhexidine (Peridex) 0.12 % solution Not Taking  No No   Sig: Use 15 mL in the mouth or throat once daily for 2 days.   Patient not taking: Reported on 1/30/2024   cyanocobalamin, vitamin B-12, 1,000 mcg tablet, sublingual 1/29/2024  No Yes   Sig: Place 2 tablets (2,000 mcg) under the tongue once daily.   ferrous sulfate 325 (65 Fe) MG EC tablet Past Week  No Yes   Sig: Take 2 tablets by mouth once daily with breakfast. Do not crush, chew, or split.   triamterene-hydrochlorothiazid (Maxzide) 75-50 mg tablet   Yes No   Sig: Take 1 tablet by mouth once daily.      Facility-Administered Medications: None    Per patient    Zoe Rosales CPhT

## 2024-01-31 VITALS
OXYGEN SATURATION: 92 % | HEIGHT: 71 IN | BODY MASS INDEX: 27.9 KG/M2 | RESPIRATION RATE: 18 BRPM | DIASTOLIC BLOOD PRESSURE: 84 MMHG | SYSTOLIC BLOOD PRESSURE: 149 MMHG | HEART RATE: 88 BPM | WEIGHT: 199.3 LBS | TEMPERATURE: 98.2 F

## 2024-01-31 LAB
ANION GAP SERPL CALC-SCNC: 13 MMOL/L (ref 10–20)
BUN SERPL-MCNC: 13 MG/DL (ref 6–23)
CALCIUM SERPL-MCNC: 8.4 MG/DL (ref 8.6–10.3)
CHLORIDE SERPL-SCNC: 104 MMOL/L (ref 98–107)
CO2 SERPL-SCNC: 25 MMOL/L (ref 21–32)
CREAT SERPL-MCNC: 0.93 MG/DL (ref 0.5–1.3)
EGFRCR SERPLBLD CKD-EPI 2021: >90 ML/MIN/1.73M*2
ERYTHROCYTE [DISTWIDTH] IN BLOOD BY AUTOMATED COUNT: 30.7 % (ref 11.5–14.5)
GLUCOSE SERPL-MCNC: 117 MG/DL (ref 74–99)
HCT VFR BLD AUTO: 31.4 % (ref 41–52)
HGB BLD-MCNC: 7.7 G/DL (ref 13.5–17.5)
MCH RBC QN AUTO: 16.6 PG (ref 26–34)
MCHC RBC AUTO-ENTMCNC: 24.5 G/DL (ref 32–36)
MCV RBC AUTO: 68 FL (ref 80–100)
NRBC BLD-RTO: 0 /100 WBCS (ref 0–0)
PLATELET # BLD AUTO: 648 X10*3/UL (ref 150–450)
POTASSIUM SERPL-SCNC: 4.1 MMOL/L (ref 3.5–5.3)
RBC # BLD AUTO: 4.63 X10*6/UL (ref 4.5–5.9)
SODIUM SERPL-SCNC: 138 MMOL/L (ref 136–145)
WBC # BLD AUTO: 16.1 X10*3/UL (ref 4.4–11.3)

## 2024-01-31 PROCEDURE — 82374 ASSAY BLOOD CARBON DIOXIDE: CPT | Performed by: COLON & RECTAL SURGERY

## 2024-01-31 PROCEDURE — 85027 COMPLETE CBC AUTOMATED: CPT | Performed by: COLON & RECTAL SURGERY

## 2024-01-31 PROCEDURE — 2500000001 HC RX 250 WO HCPCS SELF ADMINISTERED DRUGS (ALT 637 FOR MEDICARE OP): Performed by: COLON & RECTAL SURGERY

## 2024-01-31 PROCEDURE — 9420000001 HC RT PATIENT EDUCATION 5 MIN

## 2024-01-31 PROCEDURE — 36415 COLL VENOUS BLD VENIPUNCTURE: CPT | Performed by: COLON & RECTAL SURGERY

## 2024-01-31 PROCEDURE — 2500000001 HC RX 250 WO HCPCS SELF ADMINISTERED DRUGS (ALT 637 FOR MEDICARE OP): Performed by: REGISTERED NURSE

## 2024-01-31 PROCEDURE — 2500000004 HC RX 250 GENERAL PHARMACY W/ HCPCS (ALT 636 FOR OP/ED): Performed by: COLON & RECTAL SURGERY

## 2024-01-31 PROCEDURE — 99231 SBSQ HOSP IP/OBS SF/LOW 25: CPT

## 2024-01-31 RX ORDER — ONDANSETRON 4 MG/1
4 TABLET, ORALLY DISINTEGRATING ORAL EVERY 8 HOURS PRN
Qty: 20 TABLET | Refills: 0 | Status: SHIPPED | OUTPATIENT
Start: 2024-01-31

## 2024-01-31 RX ORDER — ACETAMINOPHEN 325 MG/1
650 TABLET ORAL EVERY 6 HOURS PRN
Qty: 30 TABLET | Refills: 0 | Status: SHIPPED | OUTPATIENT
Start: 2024-01-31

## 2024-01-31 RX ORDER — OXYCODONE HYDROCHLORIDE 5 MG/1
5 TABLET ORAL EVERY 6 HOURS PRN
Qty: 15 TABLET | Refills: 0 | Status: SHIPPED | OUTPATIENT
Start: 2024-01-31

## 2024-01-31 RX ADMIN — ACETAMINOPHEN 650 MG: 325 TABLET ORAL at 03:22

## 2024-01-31 RX ADMIN — ATORVASTATIN CALCIUM 20 MG: 20 TABLET, FILM COATED ORAL at 08:40

## 2024-01-31 RX ADMIN — IRON SUCROSE 100 MG: 20 INJECTION, SOLUTION INTRAVENOUS at 08:40

## 2024-01-31 RX ADMIN — GABAPENTIN 300 MG: 300 CAPSULE ORAL at 08:40

## 2024-01-31 RX ADMIN — SIMETHICONE 40 MG: 80 TABLET, CHEWABLE ORAL at 08:33

## 2024-01-31 RX ADMIN — ACETAMINOPHEN 650 MG: 325 TABLET ORAL at 08:55

## 2024-01-31 RX ADMIN — AMLODIPINE BESYLATE 10 MG: 10 TABLET ORAL at 08:40

## 2024-01-31 ASSESSMENT — PAIN SCALES - GENERAL
PAINLEVEL_OUTOF10: 2
PAINLEVEL_OUTOF10: 0 - NO PAIN
PAINLEVEL_OUTOF10: 0 - NO PAIN

## 2024-01-31 NOTE — CARE PLAN
The patient's goals for the shift include      The clinical goals for the shift include pt will remain safe throughout this shift      Problem: Pain  Goal: My pain/discomfort is manageable  Outcome: Met     Problem: Safety  Goal: Patient will be injury free during hospitalization  Outcome: Met  Goal: I will remain free of falls  Outcome: Met     Problem: Daily Care  Goal: Daily care needs are met  Outcome: Met     Problem: Psychosocial Needs  Goal: Demonstrates ability to cope with hospitalization/illness  Outcome: Met  Goal: Collaborate with me, my family, and caregiver to identify my specific goals  Outcome: Met     Problem: Discharge Barriers  Goal: My discharge needs are met  Outcome: Met

## 2024-01-31 NOTE — DISCHARGE SUMMARY
Discharge Diagnosis  Malignant neoplasm of hepatic flexure (CMS/HCC)    Issues Requiring Follow-Up  FOV    Test Results Pending At Discharge  Pending Labs       Order Current Status    Surgical Pathology Exam In process            Hospital Course  53 yr old male with colon Ca s/p right extended colectomy on 1/29/23 with Dr. Hernandez. Please see operative report for full details. Patient tolerated the procedure well and recovered briefly in PACU before being transitioned to regular nursing floor. Post-op course was uncomplicated. Diet was advanced as tolerated.  IV medication transitioned to oral as diet advanced. On the day of discharge, the pt was tolerating a diet, pain was controlled on PO pain medication, and they were ambulating and voiding spontaneously. They were discharged 1/31/23 in stable condition with instructions to follow up as outpatient. Instructed to resume home iron supplementation.    Pertinent Physical Exam At Time of Discharge  Physical Exam  Constitutional:       Appearance: Normal appearance.   HENT:      Head: Normocephalic.      Nose: Nose normal.      Mouth/Throat:      Mouth: Mucous membranes are moist.   Eyes:      Extraocular Movements: Extraocular movements intact.      Pupils: Pupils are equal, round, and reactive to light.   Cardiovascular:      Rate and Rhythm: Normal rate and regular rhythm.      Pulses: Normal pulses.      Heart sounds: Normal heart sounds.   Pulmonary:      Effort: Pulmonary effort is normal.      Breath sounds: Normal breath sounds.   Abdominal:      General: Bowel sounds are normal. There is distension (mild, soft).      Palpations: Abdomen is soft.      Tenderness: There is abdominal tenderness (appropiately).      Comments: Lap sites cdi  +BM, reports nonbloody   Genitourinary:     Comments: Voiding without issues  Musculoskeletal:         General: Normal range of motion.   Skin:     General: Skin is warm and dry.      Capillary Refill: Capillary refill takes  less than 2 seconds.   Neurological:      General: No focal deficit present.      Mental Status: He is alert. Mental status is at baseline.   Psychiatric:         Mood and Affect: Mood normal.         Behavior: Behavior normal.         Thought Content: Thought content normal.         Judgment: Judgment normal.       Home Medications     Medication List      START taking these medications     acetaminophen 325 mg tablet; Commonly known as: Tylenol; Take 2 tablets   (650 mg) by mouth every 6 hours if needed for mild pain (1 - 3).   ondansetron ODT 4 mg disintegrating tablet; Commonly known as:   Zofran-ODT; Take 1 tablet (4 mg) by mouth every 8 hours if needed for   nausea or vomiting.   oxyCODONE 5 mg immediate release tablet; Commonly known as: Roxicodone;   Take 1 tablet (5 mg) by mouth every 6 hours if needed for severe pain (7 -   10).     CHANGE how you take these medications     amLODIPine 10 mg tablet; Commonly known as: Norvasc; Take 1 tablet (10   mg) by mouth once daily.; What changed: when to take this     CONTINUE taking these medications     atorvastatin 20 mg tablet; Commonly known as: Lipitor; Take 1 tablet (20   mg) by mouth once daily.   cyanocobalamin (vitamin B-12) 1,000 mcg tablet, sublingual; Place 2   tablets (2,000 mcg) under the tongue once daily.   ferrous sulfate 325 (65 Fe) MG EC tablet; Take 2 tablets by mouth once   daily with breakfast. Do not crush, chew, or split.   NATURAL BEE POLLEN ORAL   triamterene-hydrochlorothiazid 75-50 mg tablet; Commonly known as:   Maxzide     STOP taking these medications     chlorhexidine 0.12 % solution; Commonly known as: Peridex       Outpatient Follow-Up  No future appointments.    Talha Gaines PA-C

## 2024-02-02 LAB
LABORATORY COMMENT REPORT: NORMAL
PATH REPORT.FINAL DX SPEC: NORMAL
PATH REPORT.GROSS SPEC: NORMAL
PATH REPORT.RELEVANT HX SPEC: NORMAL
PATH REPORT.TOTAL CANCER: NORMAL
PATHOLOGY SYNOPTIC REPORT: NORMAL

## 2024-02-05 ENCOUNTER — TELEPHONE (OUTPATIENT)
Dept: SURGERY | Facility: CLINIC | Age: 54
End: 2024-02-05
Payer: COMMERCIAL

## 2024-02-05 DIAGNOSIS — C18.3 MALIGNANT NEOPLASM OF HEPATIC FLEXURE (MULTI): ICD-10-CM

## 2024-02-05 NOTE — TELEPHONE ENCOUNTER
"Post op call.  He is s/p 1/9/2024 LX Right.  Patient called and spoke with Dr. Hernandez over the weekend with complaints of frequent watery stools.  Patient reports \"I'm much better\".  He has zero pain from his incision.  He is taking Advil prn but this is for his wrist where an IV was.   No redness at this site.  Wrist is \"a little swollen and tender\".  He is moving his bowels about 5 or 6 times per day.  Stool consistency is now applesauce/pudding.  His appetite is returning.  Denies abdominal bloating, nausea, and bloating.  Has been sleeping well and is feeling stronger.  Incision clean and dry.  Denies fever/chills.  I advised warm compress to the wrist.  Otherwise normal post op.  He will touch base with me on Thursday.  He only took two weeks off from work and he will let me know if he'd like more time.  He is aware that he is allowed to take six weeks off.  Kizzy Abrams RN      "

## 2024-02-08 ENCOUNTER — TELEPHONE (OUTPATIENT)
Dept: SURGERY | Facility: CLINIC | Age: 54
End: 2024-02-08
Payer: COMMERCIAL

## 2024-02-08 NOTE — TELEPHONE ENCOUNTER
"Post op call.  He is s/p 1/29/2024 Lx Extended right.  He is doing well.  He is calling to set up return to work on 2/19/2024. He will email paperwork for me to fill out.  He is moving his bowels a couple of times per day.  Stools are almost solid.  He is able to complete ADL's.  \"I'm active and I'm tolerating what I'm doing\".    Just some soreness at the incision.  He is still a bit tired.  His wrist is getting better with heat.  He is anxious to get back to work.  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  He understands that he will be tired the entire six weeks post op.  Kizzy Abrams RN    "

## 2024-02-26 NOTE — PROGRESS NOTES
Chief Compliant:  POV      History Of Present Illness  Eze Damian is a 53 y.o. male with hgb of 6.3. Underwent colonoscopy and was found to have large mass at hepatic flexure-biopsy moderately differentiated adenocarcinoma. He is now s/p right colectomy on 1/29/24.  FINAL DIAGNOSIS   A. TERMINAL ILEUM, APPENDIX, COLON, RIGHT HEMICOLECTOMY:    -- Invasive adenocarcinoma of colon, see synoptic report  -- Tubulovillous adenoma of colon x2  -- Terminal ileum and appendix with no significant pathological findings   pT3, pN1a 1/29 LN's. Margins negative. -LVI, -PNI    1/11/24 CT CAP  IMPRESSION:  1. Near circumferential mass lesion is demonstrated within the  hepatic flexure with asymmetric component of wall thickening and  lesion measuring 1.6 cm. The wall thickening is identified over a  proximal distal dimension of 4.4 cm in keeping with known history of  colon carcinoma. Of note, there is surrounding pericolonic  subcentimeter lymph nodes in the mesentery with largest lymph node  measuring 9 x 10 mm.      2. A few prominent lymph nodes are demonstrated in the caterina hepatis  as well as portacaval location which is of concern.      3. Wedge-shaped hypodensities are demonstrated throughout the spleen  with sequela of splenic infarct favored as opposed to sequela of  posttraumatic etiology given configuration and appearance. Correlate  with patient's history. No stranding of the fat about the spleen.      4. Component of prostate hypertrophy demonstrated. Correlate with PSA  levels      5. Unremarkable CT chest    Denies any F/C, N/V, CP/SOB, dysuria.   Went back to work last week. Feeling well.   Appetite: good  Energy: improving  Weight: down a few lbs.  BM: Back to normal     Meeting with oncology on Thursday. Seeing Dr. Ruiz.    Physical Exam  Constitutional: Well developed, awake/alert/oriented x3, no distress, alert and cooperative   Eyes: Sclera anicteric, no conjunctival inflammation, conjugate gaze   ENMT:  "mucous membranes moist, no apparent injury,  Head/Neck: Neck supple, no apparent injury, trachea midline, no bruits   Respiratory/Thorax: Patent airways, CTAB, normal breath sounds with good chest expansion  Cardiovascular: Regular, rate and rhythm, no murmurs, normal S1 and S2   Gastrointestinal: Nondistended, soft, non-tender. Umbilical incision is healed.   Extremities: normal extremities, no edema  Neurological: alert and oriented x3, normal strength, Normal gait   Psychological: Appropriate mood and behavior   Skin: Warm and dry, no lesions, no rashes     Last Recorded Vitals  BP (!) 145/93 (BP Location: Right arm, Patient Position: Sitting, BP Cuff Size: Adult)   Pulse (!) 132   Resp 16   Ht 1.803 m (5' 11\")   Wt 91.9 kg (202 lb 11.2 oz)   BMI 28.27 kg/m²     Rechecked pulse 98    Assessment:  53M with stage III colon cancer. S/p right colectomy on 1/29/24  Doing well post op    Plan:  -Diet: OK to slowly advance diet.   -Activity: Normal activities can be resumed. No lifting greater than 10 lbs for a full 6 weeks following surgery   -Follow-up with Dr. Hernandez or me PRN  -Pathology was reviewed with the patient. The follow up protocol for colon cancer was outlined for the patient.   -Colonoscopy 1 year after diagnosis and subsequent ones determined upon findings.  -All questions and concerns were answered. Encouraged to call with any question or concerns.      Janay Dee, APRN-CNP    "

## 2024-02-27 ENCOUNTER — OFFICE VISIT (OUTPATIENT)
Dept: SURGERY | Facility: CLINIC | Age: 54
End: 2024-02-27
Payer: COMMERCIAL

## 2024-02-27 VITALS
HEIGHT: 71 IN | RESPIRATION RATE: 16 BRPM | WEIGHT: 202.7 LBS | BODY MASS INDEX: 28.38 KG/M2 | SYSTOLIC BLOOD PRESSURE: 145 MMHG | DIASTOLIC BLOOD PRESSURE: 93 MMHG | HEART RATE: 132 BPM

## 2024-02-27 DIAGNOSIS — C18.3 MALIGNANT NEOPLASM OF HEPATIC FLEXURE (MULTI): Primary | ICD-10-CM

## 2024-02-27 PROCEDURE — 3080F DIAST BP >= 90 MM HG: CPT | Performed by: NURSE PRACTITIONER

## 2024-02-27 PROCEDURE — 99024 POSTOP FOLLOW-UP VISIT: CPT | Performed by: NURSE PRACTITIONER

## 2024-02-27 PROCEDURE — 1036F TOBACCO NON-USER: CPT | Performed by: NURSE PRACTITIONER

## 2024-02-27 PROCEDURE — 3077F SYST BP >= 140 MM HG: CPT | Performed by: NURSE PRACTITIONER

## 2024-02-27 ASSESSMENT — PAIN SCALES - GENERAL: PAINLEVEL: 0-NO PAIN

## 2024-02-28 ENCOUNTER — APPOINTMENT (OUTPATIENT)
Dept: SURGERY | Facility: CLINIC | Age: 54
End: 2024-02-28
Payer: COMMERCIAL

## 2024-02-29 ENCOUNTER — OFFICE VISIT (OUTPATIENT)
Dept: HEMATOLOGY/ONCOLOGY | Facility: CLINIC | Age: 54
End: 2024-02-29
Payer: COMMERCIAL

## 2024-02-29 VITALS
OXYGEN SATURATION: 97 % | HEART RATE: 117 BPM | DIASTOLIC BLOOD PRESSURE: 83 MMHG | RESPIRATION RATE: 18 BRPM | HEIGHT: 70 IN | SYSTOLIC BLOOD PRESSURE: 140 MMHG | BODY MASS INDEX: 28.58 KG/M2 | TEMPERATURE: 97.2 F | WEIGHT: 199.63 LBS

## 2024-02-29 DIAGNOSIS — C18.3 MALIGNANT NEOPLASM OF HEPATIC FLEXURE (MULTI): Primary | ICD-10-CM

## 2024-02-29 LAB
ALBUMIN SERPL BCP-MCNC: 4.5 G/DL (ref 3.4–5)
ALP SERPL-CCNC: 110 U/L (ref 33–120)
ALT SERPL W P-5'-P-CCNC: 32 U/L (ref 10–52)
ANION GAP SERPL CALC-SCNC: 16 MMOL/L (ref 10–20)
AST SERPL W P-5'-P-CCNC: 18 U/L (ref 9–39)
BASOPHILS # BLD AUTO: 0.06 X10*3/UL (ref 0–0.1)
BASOPHILS NFR BLD AUTO: 0.6 %
BILIRUB SERPL-MCNC: 0.4 MG/DL (ref 0–1.2)
BUN SERPL-MCNC: 20 MG/DL (ref 6–23)
CALCIUM SERPL-MCNC: 9.4 MG/DL (ref 8.6–10.3)
CHLORIDE SERPL-SCNC: 98 MMOL/L (ref 98–107)
CO2 SERPL-SCNC: 26 MMOL/L (ref 21–32)
CREAT SERPL-MCNC: 1.04 MG/DL (ref 0.5–1.3)
EGFRCR SERPLBLD CKD-EPI 2021: 86 ML/MIN/1.73M*2
EOSINOPHIL # BLD AUTO: 0.23 X10*3/UL (ref 0–0.7)
EOSINOPHIL NFR BLD AUTO: 2.2 %
ERYTHROCYTE [DISTWIDTH] IN BLOOD BY AUTOMATED COUNT: 24.7 % (ref 11.5–14.5)
FERRITIN SERPL-MCNC: 19 NG/ML (ref 20–300)
GLUCOSE SERPL-MCNC: 107 MG/DL (ref 74–99)
HCT VFR BLD AUTO: 42.2 % (ref 41–52)
HGB BLD-MCNC: 11.4 G/DL (ref 13.5–17.5)
IMM GRANULOCYTES # BLD AUTO: 0.03 X10*3/UL (ref 0–0.7)
IMM GRANULOCYTES NFR BLD AUTO: 0.3 % (ref 0–0.9)
IRON SATN MFR SERPL: ABNORMAL %
IRON SERPL-MCNC: 17 UG/DL (ref 35–150)
LYMPHOCYTES # BLD AUTO: 2.33 X10*3/UL (ref 1.2–4.8)
LYMPHOCYTES NFR BLD AUTO: 22.5 %
MCH RBC QN AUTO: 18.6 PG (ref 26–34)
MCHC RBC AUTO-ENTMCNC: 27 G/DL (ref 32–36)
MCV RBC AUTO: 69 FL (ref 80–100)
MONOCYTES # BLD AUTO: 0.91 X10*3/UL (ref 0.1–1)
MONOCYTES NFR BLD AUTO: 8.8 %
NEUTROPHILS # BLD AUTO: 6.8 X10*3/UL (ref 1.2–7.7)
NEUTROPHILS NFR BLD AUTO: 65.6 %
NRBC BLD-RTO: 0 /100 WBCS (ref 0–0)
PLATELET # BLD AUTO: 520 X10*3/UL (ref 150–450)
POTASSIUM SERPL-SCNC: 4.1 MMOL/L (ref 3.5–5.3)
PROT SERPL-MCNC: 8.4 G/DL (ref 6.4–8.2)
RBC # BLD AUTO: 6.13 X10*6/UL (ref 4.5–5.9)
SODIUM SERPL-SCNC: 136 MMOL/L (ref 136–145)
TIBC SERPL-MCNC: ABNORMAL UG/DL
UIBC SERPL-MCNC: >450 UG/DL (ref 110–370)
VIT B12 SERPL-MCNC: 487 PG/ML (ref 211–911)
WBC # BLD AUTO: 10.4 X10*3/UL (ref 4.4–11.3)

## 2024-02-29 PROCEDURE — 99215 OFFICE O/P EST HI 40 MIN: CPT | Performed by: STUDENT IN AN ORGANIZED HEALTH CARE EDUCATION/TRAINING PROGRAM

## 2024-02-29 PROCEDURE — 82607 VITAMIN B-12: CPT | Performed by: STUDENT IN AN ORGANIZED HEALTH CARE EDUCATION/TRAINING PROGRAM

## 2024-02-29 PROCEDURE — 99205 OFFICE O/P NEW HI 60 MIN: CPT | Performed by: STUDENT IN AN ORGANIZED HEALTH CARE EDUCATION/TRAINING PROGRAM

## 2024-02-29 PROCEDURE — 82728 ASSAY OF FERRITIN: CPT | Performed by: STUDENT IN AN ORGANIZED HEALTH CARE EDUCATION/TRAINING PROGRAM

## 2024-02-29 PROCEDURE — 1036F TOBACCO NON-USER: CPT | Performed by: STUDENT IN AN ORGANIZED HEALTH CARE EDUCATION/TRAINING PROGRAM

## 2024-02-29 PROCEDURE — 3077F SYST BP >= 140 MM HG: CPT | Performed by: STUDENT IN AN ORGANIZED HEALTH CARE EDUCATION/TRAINING PROGRAM

## 2024-02-29 PROCEDURE — 83540 ASSAY OF IRON: CPT | Performed by: STUDENT IN AN ORGANIZED HEALTH CARE EDUCATION/TRAINING PROGRAM

## 2024-02-29 PROCEDURE — 85025 COMPLETE CBC W/AUTO DIFF WBC: CPT | Performed by: STUDENT IN AN ORGANIZED HEALTH CARE EDUCATION/TRAINING PROGRAM

## 2024-02-29 PROCEDURE — 3079F DIAST BP 80-89 MM HG: CPT | Performed by: STUDENT IN AN ORGANIZED HEALTH CARE EDUCATION/TRAINING PROGRAM

## 2024-02-29 PROCEDURE — 84075 ASSAY ALKALINE PHOSPHATASE: CPT | Performed by: STUDENT IN AN ORGANIZED HEALTH CARE EDUCATION/TRAINING PROGRAM

## 2024-02-29 RX ORDER — PROCHLORPERAZINE MALEATE 10 MG
10 TABLET ORAL EVERY 6 HOURS PRN
Qty: 30 TABLET | Refills: 5 | Status: SHIPPED | OUTPATIENT
Start: 2024-02-29

## 2024-02-29 RX ORDER — CAPECITABINE 500 MG/1
1000 TABLET, FILM COATED ORAL
Qty: 112 TABLET | Refills: 3 | Status: SHIPPED | OUTPATIENT
Start: 2024-03-15 | End: 2024-03-08 | Stop reason: SDUPTHER

## 2024-02-29 RX ORDER — ONDANSETRON HYDROCHLORIDE 8 MG/1
8 TABLET, FILM COATED ORAL EVERY 8 HOURS PRN
Qty: 30 TABLET | Refills: 5 | Status: SHIPPED | OUTPATIENT
Start: 2024-02-29

## 2024-02-29 RX ORDER — LOPERAMIDE HYDROCHLORIDE 2 MG/1
CAPSULE ORAL
Qty: 100 CAPSULE | Refills: 2 | Status: SHIPPED | OUTPATIENT
Start: 2024-02-29

## 2024-02-29 ASSESSMENT — PATIENT HEALTH QUESTIONNAIRE - PHQ9
1. LITTLE INTEREST OR PLEASURE IN DOING THINGS: NOT AT ALL
SUM OF ALL RESPONSES TO PHQ9 QUESTIONS 1 AND 2: 0
2. FEELING DOWN, DEPRESSED OR HOPELESS: NOT AT ALL

## 2024-02-29 ASSESSMENT — PAIN SCALES - GENERAL: PAINLEVEL: 0-NO PAIN

## 2024-02-29 ASSESSMENT — COLUMBIA-SUICIDE SEVERITY RATING SCALE - C-SSRS
1. IN THE PAST MONTH, HAVE YOU WISHED YOU WERE DEAD OR WISHED YOU COULD GO TO SLEEP AND NOT WAKE UP?: NO
2. HAVE YOU ACTUALLY HAD ANY THOUGHTS OF KILLING YOURSELF?: NO
6. HAVE YOU EVER DONE ANYTHING, STARTED TO DO ANYTHING, OR PREPARED TO DO ANYTHING TO END YOUR LIFE?: NO

## 2024-02-29 ASSESSMENT — ENCOUNTER SYMPTOMS
DEPRESSION: 0
LOSS OF SENSATION IN FEET: 0
OCCASIONAL FEELINGS OF UNSTEADINESS: 0

## 2024-02-29 NOTE — PROGRESS NOTES
"Patient ID: Eze Damian is a 53 y.o. male.  Referring Physician: Mona Hernandez MD  [address] [place] [address]  Primary Care Provider: Brant Nieves MD  Surgeon: Dr. Hernandez  Visit Type: Initial Visit    Cancer History:  DIAGNOSIS: Colon cancer, hepatic flexure    STAGING: pT3 pN1a Mx    CURRENT SITES OF DISEASE:     MOLECULAR/GENOMIC:  MMR-proficient    TUMOR MARKER:   CEA 1.7 1/2024    CURRENT TREATMENT:       PRIOR TREATMENT:   1/29/24: S/p laparoscopic extended R hemicolectomy with Dr. Hernandez    HISTORY:   10/2023: Presented with iron deficiency anemia  12/18/23: EGD & C-scope showed polyps in transverse colon, mass in hepatic flexure. Biopsy hepatic flexure mass showed moderately differentiated adenocarcionma of the colon, MMR-intact  1/11/24: CT C/A/P showed near circumferential mass in hepatic flexure with surrounding subcm LNs, few prominent LNs in the caterina hepatis, wedge-shaped hypodensities throughout the spleen, component of prostate hypertrophy  1/29/24: S/p R hemicolectomy. Final pathology showed invasive adenocarcinoma Grade 2, margins negative, no LVI or PNI, 1/29 LNs involved, pT3 pN1a Mx      PMH: GERD, HLD, HTN    SH: No tobacco, EtOH, illicits    FH: No FH of malignancy      Subjective:   Feeling well over the last week since surgery. Last week was more fatigued. Eating and drinking well, no bleeding, regular bowel movements.     No fevers, chills, chest pain, shortness of breath, abdominal pain, nausea, vomiting, diarrhea, or rashes.    ROS as above. Remainder of 10-point review of systems elicited and negative.    Objective:  /83 (BP Location: Left arm, Patient Position: Sitting, BP Cuff Size: Adult long)   Pulse (!) 117   Temp 36.2 °C (97.2 °F) (Temporal)   Resp 18   Ht (S) 1.782 m (5' 10.16\")   Wt 90.5 kg (199 lb 10 oz)   SpO2 97%   BMI 28.52 kg/m²     PE:  Gen: A&O, NAD  Head: Normocephalic, atraumatic  Eyes: no scleral icterus  ENT: mucous membranes moist, " no oropharyngeal lesions  Resp: Lungs CTAB  Cardiac: Normal rate, regular rhythm, no murmurs appreciated  Abdomen: Soft, nondistended, nontender, +BS  Neuro: CNII-XII grossly intact  Psych: appropriate mood & affect  Skin: warm, dry, no apparent rashes     Surgical Pathology 1/29/24:  A. TERMINAL ILEUM, APPENDIX, COLON, RIGHT HEMICOLECTOMY:    -- Invasive adenocarcinoma of colon, see synoptic report  -- Tubulovillous adenoma of colon x2  -- Terminal ileum and appendix with no significant pathological findings    CT C/A/P 1/11/24:  1. Near circumferential mass lesion is demonstrated within the  hepatic flexure with asymmetric component of wall thickening and  lesion measuring 1.6 cm. The wall thickening is identified over a  proximal distal dimension of 4.4 cm in keeping with known history of  colon carcinoma. Of note, there is surrounding pericolonic  subcentimeter lymph nodes in the mesentery with largest lymph node  measuring 9 x 10 mm.      2. A few prominent lymph nodes are demonstrated in the caterina hepatis  as well as portacaval location which is of concern.      3. Wedge-shaped hypodensities are demonstrated throughout the spleen  with sequela of splenic infarct favored as opposed to sequela of  posttraumatic etiology given configuration and appearance. Correlate  with patient's history. No stranding of the fat about the spleen.      4. Component of prostate hypertrophy demonstrated. Correlate with PSA  levels      5. Unremarkable CT chest    Assessment & Plan:   Eze Damian is a 53 y.o. male with Stage IIIA ascending colon cancer (hepatic flexure), MMR-proficient, diagnosed 12/18/23 after presenting with iron deficiency anemia. He underwent laparoscopic extended R hemicolectomy with Dr. Hernandez on 1/29/24, final pathology showed pT3 pN1a M0.     I discussed the benefit to adjuvant chemotherapy with FOLFOX vs CapeOx, 3mo vs 6mo, and he would like to proceed with CapeOx. I discussed the risks and benefits of  chemotherapy at length, and he agrees to proceed.     Plan:   Stage IIIA ascending colon cancer, MMR-profiicient  - Plan for 3mo adjuvant chemotherapy with capecitabine (2000mg BID, days 1-14) and oxaliplatin 130mg/m2 day 1 every 21 day cycle  - RTC 2 weeks for C1

## 2024-02-29 NOTE — PROGRESS NOTES
Patient here for new patient visit with Dr. Ruiz accompanied by his mother. Medications and allergies reviewed.     Patient stated he is here after having surgery on 1/29 with Dr. Hernandez to remove colon cancer. Lap sites healed. No complaints of pain at this time. States his fatigue has improved but is tired in the evenings after work.     Pathology reviewed with Eze and his mom. Chemotherapy discussed- oxaliplatin and capecitabine. Would prefer treatments on Fridays due to work schedule. Labs on Thursdays.     Labs to be drawn today including signatera.

## 2024-03-08 ENCOUNTER — ONCOLOGY MEDICATION OUTREACH (OUTPATIENT)
Dept: HEMATOLOGY/ONCOLOGY | Facility: HOSPITAL | Age: 54
End: 2024-03-08
Payer: COMMERCIAL

## 2024-03-08 ENCOUNTER — TELEPHONE (OUTPATIENT)
Dept: HEMATOLOGY/ONCOLOGY | Facility: HOSPITAL | Age: 54
End: 2024-03-08
Payer: COMMERCIAL

## 2024-03-08 ENCOUNTER — SPECIALTY PHARMACY (OUTPATIENT)
Dept: PHARMACY | Facility: CLINIC | Age: 54
End: 2024-03-08

## 2024-03-08 DIAGNOSIS — C18.3 MALIGNANT NEOPLASM OF HEPATIC FLEXURE (MULTI): ICD-10-CM

## 2024-03-08 PROCEDURE — RXMED WILLOW AMBULATORY MEDICATION CHARGE

## 2024-03-08 RX ORDER — CAPECITABINE 500 MG/1
1000 TABLET, FILM COATED ORAL
Qty: 112 TABLET | Refills: 3 | Status: SHIPPED | OUTPATIENT
Start: 2024-03-15 | End: 2024-05-16 | Stop reason: SDUPTHER

## 2024-03-08 NOTE — TELEPHONE ENCOUNTER
Refill request received for Capecitabine 500mg.  Preferred pharmacy is [facility].  Message sent to team.

## 2024-03-08 NOTE — TELEPHONE ENCOUNTER
Per Jessica Dickinson, PharmD:    This Capecitabine Rx was sent to the patients [facility] in error, who likely rerouted to their specialty pharmacy.  I'm sending the Rx to  Specialty [facility] to process before 3/15 start date.

## 2024-03-11 ENCOUNTER — PHARMACY VISIT (OUTPATIENT)
Dept: PHARMACY | Facility: CLINIC | Age: 54
End: 2024-03-11
Payer: COMMERCIAL

## 2024-03-11 NOTE — PROGRESS NOTES
ONCOLOGY CLINICAL PHARMACY NOTE     Subjective  Eze Damian is a 53 y.o. male with stage IIIA colon cancer, contacted via phone for education.        Capecitabine delivery from [facility] set for 3/12/24.   Plan to start 3/15.     Treatment history  Treatment Details   Treatment goal [No plan goal]   Plan Name CapeOX (Capecitabine / Oxaliplatin), 21 Day Cycles   Status Active   Start Date 3/15/2024 (Planned)   End Date 5/17/2024 (Planned)   Provider Danika Ruiz MD PhD   Chemotherapy palonosetron (Aloxi) injection 250 mcg, 0.25 mg, intravenous, Once, 0 of 4 cycles    OXALIplatin (Eloxatin) 275 mg in dextrose 5 % in water (D5W) 555 mL IV, 130 mg/m2, intravenous, Once, 0 of 4 cycles    LORazepam (Ativan) injection 1 mg, 1 mg, intravenous, As needed, 0 of 4 cycles          Objective  There were no vitals taken for this visit.  Lab Results   Component Value Date    WBC 10.4 02/29/2024    HGB 11.4 (L) 02/29/2024    HCT 42.2 02/29/2024    MCV 69 (L) 02/29/2024     (H) 02/29/2024      Lab Results   Component Value Date    GLUCOSE 107 (H) 02/29/2024    CALCIUM 9.4 02/29/2024     02/29/2024    K 4.1 02/29/2024    CO2 26 02/29/2024    CL 98 02/29/2024    BUN 20 02/29/2024    CREATININE 1.04 02/29/2024     Lab Results   Component Value Date    ALT 32 02/29/2024    AST 18 02/29/2024    ALKPHOS 110 02/29/2024    BILITOT 0.4 02/29/2024       Allergies and Medications   No Known Allergies    Current Outpatient Medications:     acetaminophen (Tylenol) 325 mg tablet, Take 2 tablets (650 mg) by mouth every 6 hours if needed for mild pain (1 - 3)., Disp: 30 tablet, Rfl: 0    amLODIPine (Norvasc) 10 mg tablet, Take 1 tablet (10 mg) by mouth once daily. (Patient taking differently: Take 1 tablet (10 mg) by mouth once daily at bedtime.), Disp: 90 tablet, Rfl: 1    atorvastatin (Lipitor) 20 mg tablet, Take 1 tablet (20 mg) by mouth once daily., Disp: 90 tablet, Rfl: 1    [START ON 3/15/2024] capecitabine (Xeloda) 500 mg  tablet, Take 4 tablets (2,000 mg total) by mouth twice daily.  Beginning the evening of Day 1 until the morning of Day 15 (28 total doses). 21-day cycle. Take with food., Disp: 112 tablet, Rfl: 3    cyanocobalamin, vitamin B-12, 1,000 mcg tablet, sublingual, Place 2 tablets (2,000 mcg) under the tongue once daily. (Patient not taking: Reported on 2/27/2024), Disp: 60 tablet, Rfl: 1    ferrous sulfate 325 (65 Fe) MG EC tablet, Take 2 tablets by mouth once daily with breakfast. Do not crush, chew, or split. (Patient not taking: Reported on 2/29/2024), Disp: 60 tablet, Rfl: 11    loperamide (Imodium) 2 mg capsule, Take 2 capsules (4 mg) by mouth with the first episode of diarrhea and 1 capsule (2 mg) by mouth with any additional episodes. Maximum 8 capsules (16 mg) per day., Disp: 100 capsule, Rfl: 2    NATURAL BEE POLLEN ORAL, Take 1 capsule by mouth every other day., Disp: , Rfl:     ondansetron (Zofran) 8 mg tablet, Take 1 tablet (8 mg) by mouth every 8 hours if needed for nausea or vomiting., Disp: 30 tablet, Rfl: 5    ondansetron ODT (Zofran-ODT) 4 mg disintegrating tablet, Take 1 tablet (4 mg) by mouth every 8 hours if needed for nausea or vomiting. (Patient not taking: Reported on 2/27/2024), Disp: 20 tablet, Rfl: 0    oxyCODONE (Roxicodone) 5 mg immediate release tablet, Take 1 tablet (5 mg) by mouth every 6 hours if needed for severe pain (7 - 10). (Patient not taking: Reported on 2/27/2024), Disp: 15 tablet, Rfl: 0    prochlorperazine (Compazine) 10 mg tablet, Take 1 tablet (10 mg) by mouth every 6 hours if needed for nausea or vomiting., Disp: 30 tablet, Rfl: 5    triamterene-hydrochlorothiazid (Maxzide) 75-50 mg tablet, Take 1 tablet by mouth once daily., Disp: , Rfl:     Assessment and Plan  Eze Damian is a 53 y.o. male with colon cancer, stage IIIA, to be treated with adjuvant capeOx x 3 months.    Capecitabine 2000 mg PO BID on days 1-14 of a 21-day cycle (1000 mg/m2 * 2.12m2 with sufficient renal  function)  Oxaliplatin 130 mg/m2 on day 1 of each 21-day cycle  Standard premedications and take-home meds received from pharmacy    SENDING DOSING CALENDAR TO PATIENT VIA EMAIL: [id number]    Chemotherapy  1.  Chemotherapy Education: The chemotherapy regimen was discussed with the patient/family including treatment schedule, potential side effects, and management of side effects.  Potential side effects include: myelosuppression, diarrhea, nausea/vomiting, hand-foot syndrome, and cold sensitivity/peripheral neuropathy.    Management techniques of these side effects were discussed.  Written materials were provided including drug-specific side effect handouts.  Prescriptions for supportive care/prophylaxis (if applicable) medications were also discussed in terms of use and potential side effects.    2.  Home Medications: Reviewed patients documented home medications. Drug interactions identified between chemotherapy and patient's home medications: None  All questions were answered.  Patient/family verbalized understanding of the plan of care and management of side effects.  Spent approximately 15 minutes coordinating care and patient interaction.  Will follow-up as necessary.    Thank you for consulting [facility] Team.   Please don't hesitate to reach out for further questions regarding this patient.         Jessica Dickinson, PharmD

## 2024-03-14 ENCOUNTER — TELEMEDICINE (OUTPATIENT)
Dept: HEMATOLOGY/ONCOLOGY | Facility: CLINIC | Age: 54
End: 2024-03-14
Payer: COMMERCIAL

## 2024-03-14 ENCOUNTER — APPOINTMENT (OUTPATIENT)
Dept: HEMATOLOGY/ONCOLOGY | Facility: CLINIC | Age: 54
End: 2024-03-14
Payer: COMMERCIAL

## 2024-03-14 DIAGNOSIS — D50.0 IRON DEFICIENCY ANEMIA DUE TO CHRONIC BLOOD LOSS: Primary | ICD-10-CM

## 2024-03-14 DIAGNOSIS — C18.3 MALIGNANT NEOPLASM OF HEPATIC FLEXURE (MULTI): ICD-10-CM

## 2024-03-14 PROCEDURE — 1036F TOBACCO NON-USER: CPT | Performed by: STUDENT IN AN ORGANIZED HEALTH CARE EDUCATION/TRAINING PROGRAM

## 2024-03-14 PROCEDURE — 99213 OFFICE O/P EST LOW 20 MIN: CPT | Performed by: STUDENT IN AN ORGANIZED HEALTH CARE EDUCATION/TRAINING PROGRAM

## 2024-03-14 RX ORDER — LORAZEPAM 2 MG/ML
1 INJECTION INTRAMUSCULAR AS NEEDED
Status: CANCELLED | OUTPATIENT
Start: 2024-03-15

## 2024-03-14 RX ORDER — FAMOTIDINE 10 MG/ML
20 INJECTION INTRAVENOUS ONCE AS NEEDED
Status: CANCELLED | OUTPATIENT
Start: 2024-03-15

## 2024-03-14 RX ORDER — PALONOSETRON 0.05 MG/ML
0.25 INJECTION, SOLUTION INTRAVENOUS ONCE
Status: CANCELLED | OUTPATIENT
Start: 2024-03-15

## 2024-03-14 RX ORDER — DIPHENHYDRAMINE HYDROCHLORIDE 50 MG/ML
50 INJECTION INTRAMUSCULAR; INTRAVENOUS AS NEEDED
Status: CANCELLED | OUTPATIENT
Start: 2024-03-15

## 2024-03-14 RX ORDER — PROCHLORPERAZINE MALEATE 10 MG
10 TABLET ORAL EVERY 6 HOURS PRN
Status: CANCELLED | OUTPATIENT
Start: 2024-03-15

## 2024-03-14 RX ORDER — EPINEPHRINE 0.3 MG/.3ML
0.3 INJECTION SUBCUTANEOUS EVERY 5 MIN PRN
Status: CANCELLED | OUTPATIENT
Start: 2024-03-15

## 2024-03-14 RX ORDER — PROCHLORPERAZINE EDISYLATE 5 MG/ML
10 INJECTION INTRAMUSCULAR; INTRAVENOUS EVERY 6 HOURS PRN
Status: CANCELLED | OUTPATIENT
Start: 2024-03-15

## 2024-03-14 RX ORDER — ALBUTEROL SULFATE 0.83 MG/ML
3 SOLUTION RESPIRATORY (INHALATION) AS NEEDED
Status: CANCELLED | OUTPATIENT
Start: 2024-03-15

## 2024-03-14 RX ORDER — FERROUS SULFATE 325(65) MG
325 TABLET, DELAYED RELEASE (ENTERIC COATED) ORAL 3 TIMES WEEKLY
Qty: 12 TABLET | Refills: 3 | Status: SHIPPED | OUTPATIENT
Start: 2024-03-15 | End: 2024-04-25 | Stop reason: SDUPTHER

## 2024-03-14 ASSESSMENT — PAIN SCALES - GENERAL: PAINLEVEL: 0-NO PAIN

## 2024-03-14 NOTE — PROGRESS NOTES
Cancer History:  DIAGNOSIS: Colon cancer, hepatic flexure    STAGING: pT3 pN1a Mx    CURRENT SITES OF DISEASE:     MOLECULAR/GENOMIC:  MMR-proficient    TUMOR MARKER:   CEA 1.7 1/2024    CURRENT TREATMENT:   3/15/24: C1 adjuvant CapeOx    PRIOR TREATMENT:   1/29/24: S/p laparoscopic extended R hemicolectomy with Dr. Hernandez    HISTORY:   10/2023: Presented with iron deficiency anemia  12/18/23: EGD & C-scope showed polyps in transverse colon, mass in hepatic flexure. Biopsy hepatic flexure mass showed moderately differentiated adenocarcionma of the colon, MMR-intact  1/11/24: CT C/A/P showed near circumferential mass in hepatic flexure with surrounding subcm LNs, few prominent LNs in the caterina hepatis, wedge-shaped hypodensities throughout the spleen, component of prostate hypertrophy  1/29/24: S/p R hemicolectomy. Final pathology showed invasive adenocarcinoma Grade 2, margins negative, no LVI or PNI, 1/29 LNs involved, pT3 pN1a Mx      PMH: GERD, HLD, HTN    SH: No tobacco, EtOH, illicits    FH: No FH of malignancy      Subjective:   Feeling fantastic, energy level back to baseline. Received chemo pills in the mail on Tuesday.     No fevers, chills, chest pain, shortness of breath, abdominal pain, nausea, vomiting, diarrhea, or rashes.    ROS as above. Remainder of 10-point review of systems elicited and negative.    Objective:  There were no vitals taken for this visit.    PE:  Deferred d/t telephone encounter    ECOG Performance Status: 0      Assessment & Plan:   Eze Damian is a 53 y.o. male with Stage IIIA ascending colon cancer (hepatic flexure), MMR-proficient, diagnosed 12/18/23 after presenting with iron deficiency anemia. He underwent laparoscopic extended R hemicolectomy with Dr. Hernandez on 1/29/24, final pathology showed pT3 pN1a M0.     I discussed the benefit to adjuvant chemotherapy with FOLFOX vs CapeOx, 3mo vs 6mo, and he would like to proceed with CapeOx. I discussed the risks and benefits of  chemotherapy at length, and he agrees to proceed.     Plan:   Stage IIIA ascending colon cancer, MMR-profiicient  - Plan for 3mo adjuvant chemotherapy with capecitabine (2000mg BID, days 1-14) and oxaliplatin 130mg/m2 day 1 every 21 day cycle  - C1 3/15/24    Iron deficiency anemia  - Will send rx for PO iron 3x/week  - Recheck iron levels in 2mo 5/2024

## 2024-03-15 ENCOUNTER — DOCUMENTATION (OUTPATIENT)
Dept: HEMATOLOGY/ONCOLOGY | Facility: CLINIC | Age: 54
End: 2024-03-15
Payer: COMMERCIAL

## 2024-03-15 ENCOUNTER — TELEPHONE (OUTPATIENT)
Dept: ADMISSION | Facility: HOSPITAL | Age: 54
End: 2024-03-15
Payer: COMMERCIAL

## 2024-03-15 ENCOUNTER — INFUSION (OUTPATIENT)
Dept: HEMATOLOGY/ONCOLOGY | Facility: CLINIC | Age: 54
End: 2024-03-15
Payer: COMMERCIAL

## 2024-03-15 VITALS
OXYGEN SATURATION: 97 % | DIASTOLIC BLOOD PRESSURE: 110 MMHG | WEIGHT: 210.54 LBS | HEIGHT: 70 IN | TEMPERATURE: 97.5 F | SYSTOLIC BLOOD PRESSURE: 170 MMHG | BODY MASS INDEX: 30.14 KG/M2 | HEART RATE: 81 BPM | RESPIRATION RATE: 18 BRPM

## 2024-03-15 DIAGNOSIS — I10 HYPERTENSION, UNSPECIFIED TYPE: ICD-10-CM

## 2024-03-15 DIAGNOSIS — I10 HYPERTENSION, UNSPECIFIED TYPE: Primary | ICD-10-CM

## 2024-03-15 DIAGNOSIS — C18.3 MALIGNANT NEOPLASM OF HEPATIC FLEXURE (MULTI): ICD-10-CM

## 2024-03-15 DIAGNOSIS — K63.89 COLONIC MASS: ICD-10-CM

## 2024-03-15 LAB
HBV CORE AB SER QL: NONREACTIVE
HBV SURFACE AB SER-ACNC: <3.1 MIU/ML
HBV SURFACE AG SERPL QL IA: NONREACTIVE

## 2024-03-15 PROCEDURE — 86704 HEP B CORE ANTIBODY TOTAL: CPT

## 2024-03-15 PROCEDURE — 2500000001 HC RX 250 WO HCPCS SELF ADMINISTERED DRUGS (ALT 637 FOR MEDICARE OP): Performed by: STUDENT IN AN ORGANIZED HEALTH CARE EDUCATION/TRAINING PROGRAM

## 2024-03-15 PROCEDURE — 2500000004 HC RX 250 GENERAL PHARMACY W/ HCPCS (ALT 636 FOR OP/ED): Performed by: STUDENT IN AN ORGANIZED HEALTH CARE EDUCATION/TRAINING PROGRAM

## 2024-03-15 PROCEDURE — 2500000004 HC RX 250 GENERAL PHARMACY W/ HCPCS (ALT 636 FOR OP/ED): Performed by: NURSE PRACTITIONER

## 2024-03-15 PROCEDURE — 87340 HEPATITIS B SURFACE AG IA: CPT

## 2024-03-15 PROCEDURE — 96375 TX/PRO/DX INJ NEW DRUG ADDON: CPT | Mod: INF

## 2024-03-15 PROCEDURE — 96413 CHEMO IV INFUSION 1 HR: CPT

## 2024-03-15 PROCEDURE — 96415 CHEMO IV INFUSION ADDL HR: CPT

## 2024-03-15 PROCEDURE — 86706 HEP B SURFACE ANTIBODY: CPT

## 2024-03-15 RX ORDER — HEPARIN 100 UNIT/ML
500 SYRINGE INTRAVENOUS AS NEEDED
Status: CANCELLED | OUTPATIENT
Start: 2024-03-15

## 2024-03-15 RX ORDER — FUROSEMIDE 10 MG/ML
20 INJECTION INTRAMUSCULAR; INTRAVENOUS ONCE
Status: CANCELLED | OUTPATIENT
Start: 2024-03-15 | End: 2024-03-15

## 2024-03-15 RX ORDER — FUROSEMIDE 10 MG/ML
20 INJECTION INTRAMUSCULAR; INTRAVENOUS ONCE
Status: COMPLETED | OUTPATIENT
Start: 2024-03-15 | End: 2024-03-15

## 2024-03-15 RX ORDER — HEPARIN SODIUM,PORCINE/PF 10 UNIT/ML
50 SYRINGE (ML) INTRAVENOUS AS NEEDED
Status: CANCELLED | OUTPATIENT
Start: 2024-03-15

## 2024-03-15 RX ORDER — PALONOSETRON 0.05 MG/ML
0.25 INJECTION, SOLUTION INTRAVENOUS ONCE
Status: COMPLETED | OUTPATIENT
Start: 2024-03-15 | End: 2024-03-15

## 2024-03-15 RX ADMIN — OXALIPLATIN 275 MG: 5 INJECTION, SOLUTION INTRAVENOUS at 15:32

## 2024-03-15 RX ADMIN — DEXAMETHASONE SODIUM PHOSPHATE 12 MG: 10 INJECTION, SOLUTION INTRAMUSCULAR; INTRAVENOUS at 14:36

## 2024-03-15 RX ADMIN — PALONOSETRON HYDROCHLORIDE 250 MCG: 0.25 INJECTION INTRAVENOUS at 14:36

## 2024-03-15 RX ADMIN — FUROSEMIDE 20 MG: 10 INJECTION, SOLUTION INTRAMUSCULAR; INTRAVENOUS at 17:40

## 2024-03-15 RX ADMIN — METOPROLOL TARTRATE 25 MG: 50 TABLET, FILM COATED ORAL at 15:18

## 2024-03-15 ASSESSMENT — PAIN SCALES - GENERAL: PAINLEVEL: 0-NO PAIN

## 2024-03-15 NOTE — PROGRESS NOTES
Dr. Kaiden naranjo and EUGENE Ewing CNP notified pt  Bp 182/107 on arrival, after sitting for 15 minutes 161/96, did not take bp meds today because he took his xeloda this morning and didn't want to have a reaction taking the xeloda and BP meds, orders received from Lacey Ewing CNP to give premeds and recheck /97 30 min after premeds started, order received to give metoprolol 25 mg and start oxaliplatin      At 1726 Bp 170/110 MIR Kennedy PAC notified, orders received to give 20 mg IV lasix    1755 Repeat BP after lasix was 183/100, PA Audra Kennedy notified. Patient agitated and wants to go home. Per PA patient to take his home blood pressure medication when he goes home. If BP does not improve or patient develops any new neurologic symptoms such as headache, dizziness or weakness he is to report to ED ASAP. Patient refusing to go to ER at this time, he is asymptomatic. Education provided to patient, all questions answered, reports understanding.

## 2024-03-15 NOTE — TELEPHONE ENCOUNTER
[facility] pharmacy requesting return call for clarification of cycle length and dosing instructions for capecitabine script sent on 3/5.   PH: [phone]  Reference # [id number]

## 2024-03-15 NOTE — PATIENT INSTRUCTIONS
Ondansetron 4mg under tongue every 8 hours as needed for nausea   OR  [place] 8mg tablet every 8 hours as needed for nausea - may start on Monday morning    Prochlorperazine 10 mg 1 tablet every 6 hours as needed for nausea - may start anytime    Call you doctor for fever of 100.4 or higher, nausea or vomiting not controlled by nausea medication,

## 2024-03-15 NOTE — PROGRESS NOTES
Checking on patient getting oxalaplatin.  High blood pressure noted throughout the day, not terribly responsive to metoprolol.  Patient did not take antihypertensive medication or hydrochlorothiazide this morning.  Lasix 20mg IV ordered to try to control BP while in the office due to patient not taking his diuretic this morning.  Audra younger PA-C

## 2024-03-25 LAB
AP SUMMARY REPORT: NORMAL
SCAN RESULT: NORMAL

## 2024-03-29 ENCOUNTER — SPECIALTY PHARMACY (OUTPATIENT)
Dept: PHARMACY | Facility: CLINIC | Age: 54
End: 2024-03-29

## 2024-03-29 PROCEDURE — RXMED WILLOW AMBULATORY MEDICATION CHARGE

## 2024-04-02 ENCOUNTER — PHARMACY VISIT (OUTPATIENT)
Dept: PHARMACY | Facility: CLINIC | Age: 54
End: 2024-04-02
Payer: COMMERCIAL

## 2024-04-02 ENCOUNTER — LAB (OUTPATIENT)
Dept: LAB | Facility: LAB | Age: 54
End: 2024-04-02
Payer: COMMERCIAL

## 2024-04-02 DIAGNOSIS — D50.0 IRON DEFICIENCY ANEMIA DUE TO CHRONIC BLOOD LOSS: ICD-10-CM

## 2024-04-02 DIAGNOSIS — C18.3 MALIGNANT NEOPLASM OF HEPATIC FLEXURE (MULTI): ICD-10-CM

## 2024-04-02 PROCEDURE — 83550 IRON BINDING TEST: CPT

## 2024-04-02 PROCEDURE — 85007 BL SMEAR W/DIFF WBC COUNT: CPT

## 2024-04-02 PROCEDURE — 82728 ASSAY OF FERRITIN: CPT

## 2024-04-02 PROCEDURE — 36415 COLL VENOUS BLD VENIPUNCTURE: CPT

## 2024-04-02 PROCEDURE — 85027 COMPLETE CBC AUTOMATED: CPT

## 2024-04-02 PROCEDURE — 80053 COMPREHEN METABOLIC PANEL: CPT

## 2024-04-02 PROCEDURE — 83540 ASSAY OF IRON: CPT

## 2024-04-03 LAB
ALBUMIN SERPL BCP-MCNC: 4.4 G/DL (ref 3.4–5)
ALP SERPL-CCNC: 112 U/L (ref 33–120)
ALT SERPL W P-5'-P-CCNC: 30 U/L (ref 10–52)
ANION GAP SERPL CALC-SCNC: 14 MMOL/L (ref 10–20)
AST SERPL W P-5'-P-CCNC: 22 U/L (ref 9–39)
BASOPHILS # BLD MANUAL: 0 X10*3/UL (ref 0–0.1)
BASOPHILS NFR BLD MANUAL: 0 %
BILIRUB SERPL-MCNC: 0.4 MG/DL (ref 0–1.2)
BUN SERPL-MCNC: 20 MG/DL (ref 6–23)
CALCIUM SERPL-MCNC: 10.1 MG/DL (ref 8.6–10.6)
CHLORIDE SERPL-SCNC: 98 MMOL/L (ref 98–107)
CO2 SERPL-SCNC: 29 MMOL/L (ref 21–32)
CREAT SERPL-MCNC: 1.02 MG/DL (ref 0.5–1.3)
EGFRCR SERPLBLD CKD-EPI 2021: 88 ML/MIN/1.73M*2
EOSINOPHIL # BLD MANUAL: 0 X10*3/UL (ref 0–0.7)
EOSINOPHIL NFR BLD MANUAL: 0 %
ERYTHROCYTE [DISTWIDTH] IN BLOOD BY AUTOMATED COUNT: 23 % (ref 11.5–14.5)
GLUCOSE SERPL-MCNC: 146 MG/DL (ref 74–99)
HCT VFR BLD AUTO: 44.7 % (ref 41–52)
HGB BLD-MCNC: 11.8 G/DL (ref 13.5–17.5)
IMM GRANULOCYTES # BLD AUTO: 0.03 X10*3/UL (ref 0–0.7)
IMM GRANULOCYTES NFR BLD AUTO: 0.3 % (ref 0–0.9)
LYMPHOCYTES # BLD MANUAL: 1.55 X10*3/UL (ref 1.2–4.8)
LYMPHOCYTES NFR BLD MANUAL: 13.6 %
MCH RBC QN AUTO: 19.9 PG (ref 26–34)
MCHC RBC AUTO-ENTMCNC: 26.4 G/DL (ref 32–36)
MCV RBC AUTO: 76 FL (ref 80–100)
MONOCYTES # BLD MANUAL: 0.63 X10*3/UL (ref 0.1–1)
MONOCYTES NFR BLD MANUAL: 5.5 %
NEUTS SEG # BLD MANUAL: 9.22 X10*3/UL (ref 1.2–7)
NEUTS SEG NFR BLD MANUAL: 80.9 %
NRBC BLD-RTO: 0 /100 WBCS (ref 0–0)
OVALOCYTES BLD QL SMEAR: ABNORMAL
PLATELET # BLD AUTO: 583 X10*3/UL (ref 150–450)
POTASSIUM SERPL-SCNC: 4.7 MMOL/L (ref 3.5–5.3)
PROT SERPL-MCNC: 7.9 G/DL (ref 6.4–8.2)
RBC # BLD AUTO: 5.92 X10*6/UL (ref 4.5–5.9)
RBC MORPH BLD: ABNORMAL
SODIUM SERPL-SCNC: 136 MMOL/L (ref 136–145)
TOTAL CELLS COUNTED BLD: 110
WBC # BLD AUTO: 11.4 X10*3/UL (ref 4.4–11.3)

## 2024-04-04 ENCOUNTER — TELEMEDICINE (OUTPATIENT)
Dept: HEMATOLOGY/ONCOLOGY | Facility: CLINIC | Age: 54
End: 2024-04-04
Payer: COMMERCIAL

## 2024-04-04 ENCOUNTER — APPOINTMENT (OUTPATIENT)
Dept: HEMATOLOGY/ONCOLOGY | Facility: CLINIC | Age: 54
End: 2024-04-04
Payer: COMMERCIAL

## 2024-04-04 DIAGNOSIS — Z51.11 ENCOUNTER FOR ANTINEOPLASTIC CHEMOTHERAPY: ICD-10-CM

## 2024-04-04 DIAGNOSIS — C18.3 MALIGNANT NEOPLASM OF HEPATIC FLEXURE (MULTI): Primary | ICD-10-CM

## 2024-04-04 DIAGNOSIS — D50.0 IRON DEFICIENCY ANEMIA DUE TO CHRONIC BLOOD LOSS: ICD-10-CM

## 2024-04-04 LAB
FERRITIN SERPL-MCNC: 15 NG/ML (ref 20–300)
IRON SATN MFR SERPL: ABNORMAL %
IRON SERPL-MCNC: 22 UG/DL (ref 35–150)
TIBC SERPL-MCNC: ABNORMAL UG/DL
UIBC SERPL-MCNC: >450 UG/DL (ref 110–370)

## 2024-04-04 PROCEDURE — 1036F TOBACCO NON-USER: CPT | Performed by: STUDENT IN AN ORGANIZED HEALTH CARE EDUCATION/TRAINING PROGRAM

## 2024-04-04 PROCEDURE — 99214 OFFICE O/P EST MOD 30 MIN: CPT | Performed by: STUDENT IN AN ORGANIZED HEALTH CARE EDUCATION/TRAINING PROGRAM

## 2024-04-04 RX ORDER — ALBUTEROL SULFATE 0.83 MG/ML
3 SOLUTION RESPIRATORY (INHALATION) AS NEEDED
Status: CANCELLED | OUTPATIENT
Start: 2024-04-05

## 2024-04-04 RX ORDER — FAMOTIDINE 10 MG/ML
20 INJECTION INTRAVENOUS ONCE AS NEEDED
Status: CANCELLED | OUTPATIENT
Start: 2024-04-05

## 2024-04-04 RX ORDER — PROCHLORPERAZINE EDISYLATE 5 MG/ML
10 INJECTION INTRAMUSCULAR; INTRAVENOUS EVERY 6 HOURS PRN
Status: CANCELLED | OUTPATIENT
Start: 2024-04-05

## 2024-04-04 RX ORDER — DIPHENHYDRAMINE HYDROCHLORIDE 50 MG/ML
50 INJECTION INTRAMUSCULAR; INTRAVENOUS AS NEEDED
Status: CANCELLED | OUTPATIENT
Start: 2024-04-05

## 2024-04-04 RX ORDER — PROCHLORPERAZINE MALEATE 10 MG
10 TABLET ORAL EVERY 6 HOURS PRN
Status: CANCELLED | OUTPATIENT
Start: 2024-04-05

## 2024-04-04 RX ORDER — EPINEPHRINE 0.3 MG/.3ML
0.3 INJECTION SUBCUTANEOUS EVERY 5 MIN PRN
Status: CANCELLED | OUTPATIENT
Start: 2024-04-05

## 2024-04-04 RX ORDER — PALONOSETRON 0.05 MG/ML
0.25 INJECTION, SOLUTION INTRAVENOUS ONCE
Status: CANCELLED | OUTPATIENT
Start: 2024-04-05

## 2024-04-04 RX ORDER — LORAZEPAM 2 MG/ML
1 INJECTION INTRAMUSCULAR AS NEEDED
Status: CANCELLED | OUTPATIENT
Start: 2024-04-05

## 2024-04-04 NOTE — PROGRESS NOTES
Cancer History:  DIAGNOSIS: Colon cancer, hepatic flexure    STAGING: pT3 pN1a Mx    CURRENT SITES OF DISEASE:     MOLECULAR/GENOMIC:  MMR-proficient    TUMOR MARKER:   CEA 1.7 1/2024    CURRENT TREATMENT:   3/15/24: C1 adjuvant CapeOx  Feraheme IV x 2 doses    PRIOR TREATMENT:   1/29/24: S/p laparoscopic extended R hemicolectomy with Dr. Hernandez    HISTORY:   10/2023: Presented with iron deficiency anemia  12/18/23: EGD & C-scope showed polyps in transverse colon, mass in hepatic flexure. Biopsy hepatic flexure mass showed moderately differentiated adenocarcionma of the colon, MMR-intact  1/11/24: CT C/A/P showed near circumferential mass in hepatic flexure with surrounding subcm LNs, few prominent LNs in the caterina hepatis, wedge-shaped hypodensities throughout the spleen, component of prostate hypertrophy  1/29/24: S/p R hemicolectomy. Final pathology showed invasive adenocarcinoma Grade 2, margins negative, no LVI or PNI, 1/29 LNs involved, pT3 pN1a Mx      PMH: GERD, HLD, HTN    SH: No tobacco, EtOH, illicits    FH: No FH of malignancy      Subjective:   Feeling fantastic, energy level back this week.  Putting weight back on  Cold sensitivity more so around his mouth lasting longer.   Taking his oral iron.    No fevers, chills, chest pain, shortness of breath, abdominal pain, nausea, vomiting, diarrhea, or rashes.    ROS as above. Remainder of 10-point review of systems elicited and negative.    Objective:  There were no vitals taken for this visit.    PE:  Deferred d/t telephone encounter    ECOG Performance Status: 0      Assessment & Plan:   Eze Damian is a 53 y.o. male with Stage IIIA ascending colon cancer (hepatic flexure), MMR-proficient, diagnosed 12/18/23 after presenting with iron deficiency anemia. He underwent laparoscopic extended R hemicolectomy with Dr. Hernandez on 1/29/24, final pathology showed pT3 pN1a M0.     I discussed the benefit to adjuvant chemotherapy with FOLFOX vs CapeOx, 3mo vs  6mo, and he would like to proceed with CapeOx. I discussed the risks and benefits of chemotherapy at length, and he agrees to proceed.     4/4/24: Did great with C1 CapeOx. Noticed he still has microcytic anemia. He is still taking oral iron. Ordered iron studies which indicate he is still iron deficient. I ordered IV iron which will help boost his iron stores up while he is receiving chemotherapy since oral iron takes some time to take effect.     Plan:   Stage IIIA ascending colon cancer, MMR-profiicient  - Plan for 3mo (4 cycles) adjuvant chemotherapy with capecitabine (2000mg BID, days 1-14) and oxaliplatin 130mg/m2 day 1 every 21 day cycle  - C2 CapeOx 4/5/24   -Start IV Feraheme whenever his insurance approves it  - RTC in 3 weeks via virtual visit    Iron deficiency anemia  - Will send rx for PO iron 3x/week  - Recheck iron levels in 2mo 5/2024

## 2024-04-05 ENCOUNTER — INFUSION (OUTPATIENT)
Dept: HEMATOLOGY/ONCOLOGY | Facility: CLINIC | Age: 54
End: 2024-04-05
Payer: COMMERCIAL

## 2024-04-05 VITALS
DIASTOLIC BLOOD PRESSURE: 87 MMHG | OXYGEN SATURATION: 100 % | RESPIRATION RATE: 18 BRPM | WEIGHT: 211.2 LBS | BODY MASS INDEX: 30.17 KG/M2 | HEART RATE: 96 BPM | SYSTOLIC BLOOD PRESSURE: 131 MMHG | TEMPERATURE: 97.4 F

## 2024-04-05 DIAGNOSIS — C18.3 MALIGNANT NEOPLASM OF HEPATIC FLEXURE (MULTI): ICD-10-CM

## 2024-04-05 DIAGNOSIS — D50.0 IRON DEFICIENCY ANEMIA DUE TO CHRONIC BLOOD LOSS: ICD-10-CM

## 2024-04-05 PROBLEM — Z51.11 ENCOUNTER FOR ANTINEOPLASTIC CHEMOTHERAPY: Status: ACTIVE | Noted: 2024-04-05

## 2024-04-05 PROCEDURE — 96413 CHEMO IV INFUSION 1 HR: CPT

## 2024-04-05 PROCEDURE — 96375 TX/PRO/DX INJ NEW DRUG ADDON: CPT | Mod: INF

## 2024-04-05 PROCEDURE — 2500000004 HC RX 250 GENERAL PHARMACY W/ HCPCS (ALT 636 FOR OP/ED): Performed by: STUDENT IN AN ORGANIZED HEALTH CARE EDUCATION/TRAINING PROGRAM

## 2024-04-05 PROCEDURE — 96415 CHEMO IV INFUSION ADDL HR: CPT

## 2024-04-05 PROCEDURE — 96367 TX/PROPH/DG ADDL SEQ IV INF: CPT

## 2024-04-05 RX ORDER — PROCHLORPERAZINE EDISYLATE 5 MG/ML
10 INJECTION INTRAMUSCULAR; INTRAVENOUS EVERY 6 HOURS PRN
Status: DISCONTINUED | OUTPATIENT
Start: 2024-04-05 | End: 2024-04-05 | Stop reason: HOSPADM

## 2024-04-05 RX ORDER — EPINEPHRINE 0.3 MG/.3ML
0.3 INJECTION SUBCUTANEOUS EVERY 5 MIN PRN
Status: DISCONTINUED | OUTPATIENT
Start: 2024-04-05 | End: 2024-04-05 | Stop reason: HOSPADM

## 2024-04-05 RX ORDER — LORAZEPAM 2 MG/ML
1 INJECTION INTRAMUSCULAR AS NEEDED
Status: DISCONTINUED | OUTPATIENT
Start: 2024-04-05 | End: 2024-04-05 | Stop reason: HOSPADM

## 2024-04-05 RX ORDER — FAMOTIDINE 10 MG/ML
20 INJECTION INTRAVENOUS ONCE AS NEEDED
Status: CANCELLED | OUTPATIENT
Start: 2024-04-12

## 2024-04-05 RX ORDER — PALONOSETRON 0.05 MG/ML
0.25 INJECTION, SOLUTION INTRAVENOUS ONCE
Status: COMPLETED | OUTPATIENT
Start: 2024-04-05 | End: 2024-04-05

## 2024-04-05 RX ORDER — ALBUTEROL SULFATE 0.83 MG/ML
3 SOLUTION RESPIRATORY (INHALATION) AS NEEDED
Status: CANCELLED | OUTPATIENT
Start: 2024-04-12

## 2024-04-05 RX ORDER — DIPHENHYDRAMINE HYDROCHLORIDE 50 MG/ML
50 INJECTION INTRAMUSCULAR; INTRAVENOUS AS NEEDED
Status: CANCELLED | OUTPATIENT
Start: 2024-04-12

## 2024-04-05 RX ORDER — EPINEPHRINE 0.3 MG/.3ML
0.3 INJECTION SUBCUTANEOUS EVERY 5 MIN PRN
Status: CANCELLED | OUTPATIENT
Start: 2024-04-12

## 2024-04-05 RX ORDER — PROCHLORPERAZINE MALEATE 10 MG
10 TABLET ORAL EVERY 6 HOURS PRN
Status: DISCONTINUED | OUTPATIENT
Start: 2024-04-05 | End: 2024-04-05 | Stop reason: HOSPADM

## 2024-04-05 RX ORDER — ALBUTEROL SULFATE 0.83 MG/ML
3 SOLUTION RESPIRATORY (INHALATION) AS NEEDED
Status: DISCONTINUED | OUTPATIENT
Start: 2024-04-05 | End: 2024-04-05 | Stop reason: HOSPADM

## 2024-04-05 RX ORDER — DIPHENHYDRAMINE HYDROCHLORIDE 50 MG/ML
50 INJECTION INTRAMUSCULAR; INTRAVENOUS AS NEEDED
Status: DISCONTINUED | OUTPATIENT
Start: 2024-04-05 | End: 2024-04-05 | Stop reason: HOSPADM

## 2024-04-05 RX ORDER — FAMOTIDINE 10 MG/ML
20 INJECTION INTRAVENOUS ONCE AS NEEDED
Status: DISCONTINUED | OUTPATIENT
Start: 2024-04-05 | End: 2024-04-05 | Stop reason: HOSPADM

## 2024-04-05 RX ADMIN — OXALIPLATIN 275 MG: 5 INJECTION, SOLUTION INTRAVENOUS at 13:54

## 2024-04-05 RX ADMIN — DEXAMETHASONE SODIUM PHOSPHATE 12 MG: 10 INJECTION, SOLUTION INTRAMUSCULAR; INTRAVENOUS at 12:48

## 2024-04-05 RX ADMIN — PALONOSETRON HYDROCHLORIDE 250 MCG: 0.25 INJECTION INTRAVENOUS at 12:47

## 2024-04-05 RX ADMIN — FERUMOXYTOL 510 MG: 510 INJECTION INTRAVENOUS at 13:24

## 2024-04-05 ASSESSMENT — PAIN SCALES - GENERAL: PAINLEVEL: 0-NO PAIN

## 2024-04-05 NOTE — PROGRESS NOTES
Pt states he can not make it to his infusion appt for iron next week on 4/12 as he has to work. Pt states he can come in 30min early (1:30pm) for his next infusion appt on 4/26 in order to get his 2nd iron infusion - per Corrie gill RN this is ok to do.

## 2024-04-12 ENCOUNTER — APPOINTMENT (OUTPATIENT)
Dept: HEMATOLOGY/ONCOLOGY | Facility: CLINIC | Age: 54
End: 2024-04-12
Payer: COMMERCIAL

## 2024-04-23 ENCOUNTER — SPECIALTY PHARMACY (OUTPATIENT)
Dept: PHARMACY | Facility: CLINIC | Age: 54
End: 2024-04-23

## 2024-04-23 ENCOUNTER — LAB (OUTPATIENT)
Dept: LAB | Facility: LAB | Age: 54
End: 2024-04-23
Payer: COMMERCIAL

## 2024-04-23 DIAGNOSIS — C18.3 MALIGNANT NEOPLASM OF HEPATIC FLEXURE (MULTI): ICD-10-CM

## 2024-04-23 PROCEDURE — RXMED WILLOW AMBULATORY MEDICATION CHARGE

## 2024-04-23 PROCEDURE — 80053 COMPREHEN METABOLIC PANEL: CPT

## 2024-04-23 PROCEDURE — 85025 COMPLETE CBC W/AUTO DIFF WBC: CPT

## 2024-04-23 PROCEDURE — 36415 COLL VENOUS BLD VENIPUNCTURE: CPT

## 2024-04-24 ENCOUNTER — PHARMACY VISIT (OUTPATIENT)
Dept: PHARMACY | Facility: CLINIC | Age: 54
End: 2024-04-24
Payer: COMMERCIAL

## 2024-04-24 LAB
ALBUMIN SERPL BCP-MCNC: 4.7 G/DL (ref 3.4–5)
ALP SERPL-CCNC: 125 U/L (ref 33–120)
ALT SERPL W P-5'-P-CCNC: 45 U/L (ref 10–52)
ANION GAP SERPL CALC-SCNC: 16 MMOL/L (ref 10–20)
AST SERPL W P-5'-P-CCNC: 32 U/L (ref 9–39)
BASOPHILS # BLD AUTO: 0.05 X10*3/UL (ref 0–0.1)
BASOPHILS NFR BLD AUTO: 0.6 %
BILIRUB SERPL-MCNC: 0.5 MG/DL (ref 0–1.2)
BUN SERPL-MCNC: 14 MG/DL (ref 6–23)
CALCIUM SERPL-MCNC: 10 MG/DL (ref 8.6–10.6)
CHLORIDE SERPL-SCNC: 98 MMOL/L (ref 98–107)
CO2 SERPL-SCNC: 29 MMOL/L (ref 21–32)
CREAT SERPL-MCNC: 0.95 MG/DL (ref 0.5–1.3)
EGFRCR SERPLBLD CKD-EPI 2021: >90 ML/MIN/1.73M*2
EOSINOPHIL # BLD AUTO: 0.18 X10*3/UL (ref 0–0.7)
EOSINOPHIL NFR BLD AUTO: 2.1 %
ERYTHROCYTE [DISTWIDTH] IN BLOOD BY AUTOMATED COUNT: 28.5 % (ref 11.5–14.5)
GLUCOSE SERPL-MCNC: 109 MG/DL (ref 74–99)
HCT VFR BLD AUTO: 47.1 % (ref 41–52)
HGB BLD-MCNC: 13.6 G/DL (ref 13.5–17.5)
IMM GRANULOCYTES # BLD AUTO: 0.02 X10*3/UL (ref 0–0.7)
IMM GRANULOCYTES NFR BLD AUTO: 0.2 % (ref 0–0.9)
LYMPHOCYTES # BLD AUTO: 2.19 X10*3/UL (ref 1.2–4.8)
LYMPHOCYTES NFR BLD AUTO: 25.6 %
MCH RBC QN AUTO: 22.4 PG (ref 26–34)
MCHC RBC AUTO-ENTMCNC: 28.9 G/DL (ref 32–36)
MCV RBC AUTO: 78 FL (ref 80–100)
MONOCYTES # BLD AUTO: 1.28 X10*3/UL (ref 0.1–1)
MONOCYTES NFR BLD AUTO: 15 %
NEUTROPHILS # BLD AUTO: 4.83 X10*3/UL (ref 1.2–7.7)
NEUTROPHILS NFR BLD AUTO: 56.5 %
NRBC BLD-RTO: 0 /100 WBCS (ref 0–0)
PLATELET # BLD AUTO: 389 X10*3/UL (ref 150–450)
POTASSIUM SERPL-SCNC: 4.5 MMOL/L (ref 3.5–5.3)
PROT SERPL-MCNC: 8.6 G/DL (ref 6.4–8.2)
RBC # BLD AUTO: 6.06 X10*6/UL (ref 4.5–5.9)
RBC MORPH BLD: NORMAL
SODIUM SERPL-SCNC: 138 MMOL/L (ref 136–145)
WBC # BLD AUTO: 8.6 X10*3/UL (ref 4.4–11.3)

## 2024-04-25 ENCOUNTER — TELEMEDICINE (OUTPATIENT)
Dept: HEMATOLOGY/ONCOLOGY | Facility: CLINIC | Age: 54
End: 2024-04-25
Payer: COMMERCIAL

## 2024-04-25 DIAGNOSIS — Z51.11 ENCOUNTER FOR ANTINEOPLASTIC CHEMOTHERAPY: ICD-10-CM

## 2024-04-25 DIAGNOSIS — C18.3 MALIGNANT NEOPLASM OF HEPATIC FLEXURE (MULTI): Primary | ICD-10-CM

## 2024-04-25 DIAGNOSIS — I10 PRIMARY HYPERTENSION: ICD-10-CM

## 2024-04-25 PROCEDURE — 99214 OFFICE O/P EST MOD 30 MIN: CPT | Performed by: STUDENT IN AN ORGANIZED HEALTH CARE EDUCATION/TRAINING PROGRAM

## 2024-04-25 PROCEDURE — 1036F TOBACCO NON-USER: CPT | Performed by: STUDENT IN AN ORGANIZED HEALTH CARE EDUCATION/TRAINING PROGRAM

## 2024-04-25 RX ORDER — PROCHLORPERAZINE EDISYLATE 5 MG/ML
10 INJECTION INTRAMUSCULAR; INTRAVENOUS EVERY 6 HOURS PRN
Status: CANCELLED | OUTPATIENT
Start: 2024-04-26

## 2024-04-25 RX ORDER — FAMOTIDINE 10 MG/ML
20 INJECTION INTRAVENOUS ONCE AS NEEDED
Status: CANCELLED | OUTPATIENT
Start: 2024-04-26

## 2024-04-25 RX ORDER — FERROUS SULFATE 325(65) MG
325 TABLET, DELAYED RELEASE (ENTERIC COATED) ORAL 3 TIMES WEEKLY
Qty: 26 TABLET | Refills: 0 | Status: SHIPPED | OUTPATIENT
Start: 2024-04-26

## 2024-04-25 RX ORDER — PALONOSETRON 0.05 MG/ML
0.25 INJECTION, SOLUTION INTRAVENOUS ONCE
Status: CANCELLED | OUTPATIENT
Start: 2024-04-26

## 2024-04-25 RX ORDER — PROCHLORPERAZINE MALEATE 10 MG
10 TABLET ORAL EVERY 6 HOURS PRN
Status: CANCELLED | OUTPATIENT
Start: 2024-04-26

## 2024-04-25 RX ORDER — TRIAMTERENE AND HYDROCHLOROTHIAZIDE 75; 50 MG/1; MG/1
1 TABLET ORAL DAILY
Qty: 90 TABLET | Refills: 0 | Status: SHIPPED | OUTPATIENT
Start: 2024-04-25

## 2024-04-25 RX ORDER — EPINEPHRINE 0.3 MG/.3ML
0.3 INJECTION SUBCUTANEOUS EVERY 5 MIN PRN
Status: CANCELLED | OUTPATIENT
Start: 2024-04-26

## 2024-04-25 RX ORDER — DIPHENHYDRAMINE HYDROCHLORIDE 50 MG/ML
50 INJECTION INTRAMUSCULAR; INTRAVENOUS AS NEEDED
Status: CANCELLED | OUTPATIENT
Start: 2024-04-26

## 2024-04-25 RX ORDER — LORAZEPAM 2 MG/ML
1 INJECTION INTRAMUSCULAR AS NEEDED
Status: CANCELLED | OUTPATIENT
Start: 2024-04-26

## 2024-04-25 RX ORDER — ALBUTEROL SULFATE 0.83 MG/ML
3 SOLUTION RESPIRATORY (INHALATION) AS NEEDED
Status: CANCELLED | OUTPATIENT
Start: 2024-04-26

## 2024-04-25 NOTE — PROGRESS NOTES
Cancer History:  DIAGNOSIS: Colon cancer, hepatic flexure    STAGING: pT3 pN1a Mx    CURRENT SITES OF DISEASE:     MOLECULAR/GENOMIC:  MMR-proficient    TUMOR MARKER:   CEA 1.7 1/2024    CURRENT TREATMENT:   3/15/24: C1 adjuvant CapeOx, After C2 CapeOx had debilitating tingling in hands and feet and felt exhausted. Will dose reduce oxaliplatin by 20% for C3 and C4  Feraheme IV x 2 doses    PRIOR TREATMENT:   1/29/24: S/p laparoscopic extended R hemicolectomy with Dr. Hernandez    HISTORY:   10/2023: Presented with iron deficiency anemia  12/18/23: EGD & C-scope showed polyps in transverse colon, mass in hepatic flexure. Biopsy hepatic flexure mass showed moderately differentiated adenocarcionma of the colon, MMR-intact  1/11/24: CT C/A/P showed near circumferential mass in hepatic flexure with surrounding subcm LNs, few prominent LNs in the caterina hepatis, wedge-shaped hypodensities throughout the spleen, component of prostate hypertrophy  1/29/24: S/p R hemicolectomy. Final pathology showed invasive adenocarcinoma Grade 2, margins negative, no LVI or PNI, 1/29 LNs involved, pT3 pN1a Mx      PMH: GERD, HLD, HTN    SH: No tobacco, EtOH, illicits    FH: No FH of malignancy      Subjective:   After C2 CapeOx had debilitating tingling in hands and feet and felt exhausted. This lasted for 2 days, started right after he walked out of [facility]. He used heating pad. Nothing helped    No fevers, chills, chest pain, shortness of breath, abdominal pain, nausea, vomiting, diarrhea, or rashes.    ROS as above. Remainder of 10-point review of systems elicited and negative.    Objective:  There were no vitals taken for this visit.    PE:  Gen: A&O, NAD  Head: Normocephalic, atraumatic  Eyes: no scleral icterus  ENT: mucous membranes moist  Resp: no apparent respiratory distress  Neuro: CNII-XII grossly intact  Psych: appropriate mood & affect  Skin: warm, dry, no apparent rashes    ECOG Performance Status: 0      Assessment & Plan:    Eze BOOTHE Rickie is a 53 y.o. male with Stage IIIA ascending colon cancer (hepatic flexure), MMR-proficient, diagnosed 12/18/23 after presenting with iron deficiency anemia. He underwent laparoscopic extended R hemicolectomy with Dr. Hernandez on 1/29/24, final pathology showed pT3 pN1a M0.     I discussed the benefit to adjuvant chemotherapy with FOLFOX vs CapeOx, 3mo vs 6mo, and he would like to proceed with CapeOx. I discussed the risks and benefits of chemotherapy at length, and he agrees to proceed.     4/4/24: Did great with C1 CapeOx. Noticed he still has microcytic anemia. He is still taking oral iron. Ordered iron studies which indicate he is still iron deficient. I ordered IV iron which will help boost his iron stores up while he is receiving chemotherapy since oral iron takes some time to take effect.     4/25/24 After C2, had debilitating tingling in fingertips and feet and was exhausted. Symptoms lasted 2 days. Will dose reduce Oxaliplatin by 20%.    Plan:   Stage IIIA ascending colon cancer, MMR-profiicient  - Plan for 3mo (4 cycles) adjuvant chemotherapy with capecitabine (2000mg BID, days 1-14) and oxaliplatin 130mg/m2 day 1 every 21 day cycle  - C3 CapeOx 4/26/24, dose reduce Oxaliplatin by 20% due to debilitating tingling in fingertips and feet and fatigue  -  Second dose Feraheme 4/26. Continue oral iron 3x week  - RTC in 3 weeks via virtual visit    Iron deficiency anemia  - Will send rx for PO iron 3x/week  - Recheck iron levels in 2mo 5/2024

## 2024-04-26 ENCOUNTER — INFUSION (OUTPATIENT)
Dept: HEMATOLOGY/ONCOLOGY | Facility: CLINIC | Age: 54
End: 2024-04-26
Payer: COMMERCIAL

## 2024-04-26 ENCOUNTER — APPOINTMENT (OUTPATIENT)
Dept: HEMATOLOGY/ONCOLOGY | Facility: CLINIC | Age: 54
End: 2024-04-26
Payer: COMMERCIAL

## 2024-04-26 VITALS
OXYGEN SATURATION: 97 % | HEART RATE: 118 BPM | WEIGHT: 212.74 LBS | SYSTOLIC BLOOD PRESSURE: 152 MMHG | BODY MASS INDEX: 30.39 KG/M2 | RESPIRATION RATE: 18 BRPM | DIASTOLIC BLOOD PRESSURE: 90 MMHG | TEMPERATURE: 97.7 F

## 2024-04-26 DIAGNOSIS — C18.3 MALIGNANT NEOPLASM OF HEPATIC FLEXURE (MULTI): ICD-10-CM

## 2024-04-26 DIAGNOSIS — D50.0 IRON DEFICIENCY ANEMIA DUE TO CHRONIC BLOOD LOSS: ICD-10-CM

## 2024-04-26 PROCEDURE — 2500000004 HC RX 250 GENERAL PHARMACY W/ HCPCS (ALT 636 FOR OP/ED): Performed by: STUDENT IN AN ORGANIZED HEALTH CARE EDUCATION/TRAINING PROGRAM

## 2024-04-26 PROCEDURE — 96367 TX/PROPH/DG ADDL SEQ IV INF: CPT

## 2024-04-26 PROCEDURE — 96415 CHEMO IV INFUSION ADDL HR: CPT

## 2024-04-26 PROCEDURE — 96375 TX/PRO/DX INJ NEW DRUG ADDON: CPT | Mod: INF

## 2024-04-26 PROCEDURE — 96413 CHEMO IV INFUSION 1 HR: CPT

## 2024-04-26 RX ORDER — DIPHENHYDRAMINE HYDROCHLORIDE 50 MG/ML
50 INJECTION INTRAMUSCULAR; INTRAVENOUS AS NEEDED
OUTPATIENT
Start: 2024-05-14

## 2024-04-26 RX ORDER — ALBUTEROL SULFATE 0.83 MG/ML
3 SOLUTION RESPIRATORY (INHALATION) AS NEEDED
Status: DISCONTINUED | OUTPATIENT
Start: 2024-04-26 | End: 2024-04-26 | Stop reason: HOSPADM

## 2024-04-26 RX ORDER — EPINEPHRINE 0.3 MG/.3ML
0.3 INJECTION SUBCUTANEOUS EVERY 5 MIN PRN
OUTPATIENT
Start: 2024-05-14

## 2024-04-26 RX ORDER — EPINEPHRINE 0.3 MG/.3ML
0.3 INJECTION SUBCUTANEOUS EVERY 5 MIN PRN
Status: DISCONTINUED | OUTPATIENT
Start: 2024-04-26 | End: 2024-04-26 | Stop reason: HOSPADM

## 2024-04-26 RX ORDER — FAMOTIDINE 10 MG/ML
20 INJECTION INTRAVENOUS ONCE AS NEEDED
Status: DISCONTINUED | OUTPATIENT
Start: 2024-04-26 | End: 2024-04-26 | Stop reason: HOSPADM

## 2024-04-26 RX ORDER — PROCHLORPERAZINE EDISYLATE 5 MG/ML
10 INJECTION INTRAMUSCULAR; INTRAVENOUS EVERY 6 HOURS PRN
Status: DISCONTINUED | OUTPATIENT
Start: 2024-04-26 | End: 2024-04-26 | Stop reason: HOSPADM

## 2024-04-26 RX ORDER — DIPHENHYDRAMINE HYDROCHLORIDE 50 MG/ML
50 INJECTION INTRAMUSCULAR; INTRAVENOUS AS NEEDED
Status: DISCONTINUED | OUTPATIENT
Start: 2024-04-26 | End: 2024-04-26 | Stop reason: HOSPADM

## 2024-04-26 RX ORDER — ALBUTEROL SULFATE 0.83 MG/ML
3 SOLUTION RESPIRATORY (INHALATION) AS NEEDED
OUTPATIENT
Start: 2024-05-14

## 2024-04-26 RX ORDER — LORAZEPAM 2 MG/ML
1 INJECTION INTRAMUSCULAR AS NEEDED
Status: DISCONTINUED | OUTPATIENT
Start: 2024-04-26 | End: 2024-04-26 | Stop reason: HOSPADM

## 2024-04-26 RX ORDER — FAMOTIDINE 10 MG/ML
20 INJECTION INTRAVENOUS ONCE AS NEEDED
OUTPATIENT
Start: 2024-05-14

## 2024-04-26 RX ORDER — PALONOSETRON 0.05 MG/ML
0.25 INJECTION, SOLUTION INTRAVENOUS ONCE
Status: COMPLETED | OUTPATIENT
Start: 2024-04-26 | End: 2024-04-26

## 2024-04-26 RX ORDER — PROCHLORPERAZINE MALEATE 10 MG
10 TABLET ORAL EVERY 6 HOURS PRN
Status: DISCONTINUED | OUTPATIENT
Start: 2024-04-26 | End: 2024-04-26 | Stop reason: HOSPADM

## 2024-04-26 RX ADMIN — OXALIPLATIN 220 MG: 5 INJECTION, SOLUTION INTRAVENOUS at 15:12

## 2024-04-26 RX ADMIN — DEXAMETHASONE SODIUM PHOSPHATE 12 MG: 10 INJECTION, SOLUTION INTRAMUSCULAR; INTRAVENOUS at 14:55

## 2024-04-26 RX ADMIN — PALONOSETRON HYDROCHLORIDE 250 MCG: 0.25 INJECTION INTRAVENOUS at 14:55

## 2024-04-26 RX ADMIN — FERUMOXYTOL 510 MG: 510 INJECTION INTRAVENOUS at 14:31

## 2024-04-29 DIAGNOSIS — I10 HYPERTENSION, UNSPECIFIED TYPE: ICD-10-CM

## 2024-05-03 RX ORDER — AMLODIPINE BESYLATE 10 MG/1
10 TABLET ORAL NIGHTLY
Qty: 90 TABLET | Refills: 0 | Status: SHIPPED | OUTPATIENT
Start: 2024-05-03 | End: 2025-05-03

## 2024-05-13 ENCOUNTER — LAB (OUTPATIENT)
Dept: LAB | Facility: LAB | Age: 54
End: 2024-05-13
Payer: COMMERCIAL

## 2024-05-13 DIAGNOSIS — D50.0 IRON DEFICIENCY ANEMIA DUE TO CHRONIC BLOOD LOSS: ICD-10-CM

## 2024-05-13 DIAGNOSIS — C18.3 MALIGNANT NEOPLASM OF HEPATIC FLEXURE (MULTI): ICD-10-CM

## 2024-05-13 LAB
ALBUMIN SERPL BCP-MCNC: 4.6 G/DL (ref 3.4–5)
ALP SERPL-CCNC: 133 U/L (ref 33–120)
ALT SERPL W P-5'-P-CCNC: 42 U/L (ref 10–52)
ANION GAP SERPL CALC-SCNC: 16 MMOL/L (ref 10–20)
AST SERPL W P-5'-P-CCNC: 34 U/L (ref 9–39)
BILIRUB SERPL-MCNC: 0.6 MG/DL (ref 0–1.2)
BUN SERPL-MCNC: 19 MG/DL (ref 6–23)
CALCIUM SERPL-MCNC: 10.4 MG/DL (ref 8.6–10.6)
CEA SERPL-MCNC: 2.5 UG/L
CHLORIDE SERPL-SCNC: 98 MMOL/L (ref 98–107)
CO2 SERPL-SCNC: 28 MMOL/L (ref 21–32)
CREAT SERPL-MCNC: 1.14 MG/DL (ref 0.5–1.3)
EGFRCR SERPLBLD CKD-EPI 2021: 77 ML/MIN/1.73M*2
FERRITIN SERPL-MCNC: 436 NG/ML (ref 20–300)
GLUCOSE SERPL-MCNC: 135 MG/DL (ref 74–99)
IRON SATN MFR SERPL: ABNORMAL %
IRON SERPL-MCNC: 73 UG/DL (ref 35–150)
POTASSIUM SERPL-SCNC: 4.4 MMOL/L (ref 3.5–5.3)
PROT SERPL-MCNC: 8.4 G/DL (ref 6.4–8.2)
SODIUM SERPL-SCNC: 138 MMOL/L (ref 136–145)
TIBC SERPL-MCNC: ABNORMAL UG/DL
UIBC SERPL-MCNC: >450 UG/DL (ref 110–370)

## 2024-05-13 PROCEDURE — 83550 IRON BINDING TEST: CPT

## 2024-05-13 PROCEDURE — 82378 CARCINOEMBRYONIC ANTIGEN: CPT

## 2024-05-13 PROCEDURE — 83540 ASSAY OF IRON: CPT

## 2024-05-13 PROCEDURE — 82728 ASSAY OF FERRITIN: CPT

## 2024-05-13 PROCEDURE — 80053 COMPREHEN METABOLIC PANEL: CPT

## 2024-05-13 PROCEDURE — 36415 COLL VENOUS BLD VENIPUNCTURE: CPT

## 2024-05-13 PROCEDURE — 85025 COMPLETE CBC W/AUTO DIFF WBC: CPT

## 2024-05-14 ENCOUNTER — SPECIALTY PHARMACY (OUTPATIENT)
Dept: PHARMACY | Facility: CLINIC | Age: 54
End: 2024-05-14

## 2024-05-14 LAB
BASOPHILS # BLD AUTO: 0.05 X10*3/UL (ref 0–0.1)
BASOPHILS NFR BLD AUTO: 0.5 %
EOSINOPHIL # BLD AUTO: 0.07 X10*3/UL (ref 0–0.7)
EOSINOPHIL NFR BLD AUTO: 0.7 %
ERYTHROCYTE [DISTWIDTH] IN BLOOD BY AUTOMATED COUNT: 32.4 % (ref 11.5–14.5)
HCT VFR BLD AUTO: 47.2 % (ref 41–52)
HGB BLD-MCNC: 14.5 G/DL (ref 13.5–17.5)
IMM GRANULOCYTES # BLD AUTO: 0.03 X10*3/UL (ref 0–0.7)
IMM GRANULOCYTES NFR BLD AUTO: 0.3 % (ref 0–0.9)
LYMPHOCYTES # BLD AUTO: 1.89 X10*3/UL (ref 1.2–4.8)
LYMPHOCYTES NFR BLD AUTO: 19.6 %
MCH RBC QN AUTO: 24.7 PG (ref 26–34)
MCHC RBC AUTO-ENTMCNC: 30.7 G/DL (ref 32–36)
MCV RBC AUTO: 81 FL (ref 80–100)
MONOCYTES # BLD AUTO: 1.31 X10*3/UL (ref 0.1–1)
MONOCYTES NFR BLD AUTO: 13.6 %
NEUTROPHILS # BLD AUTO: 6.3 X10*3/UL (ref 1.2–7.7)
NEUTROPHILS NFR BLD AUTO: 65.3 %
NRBC BLD-RTO: 0 /100 WBCS (ref 0–0)
PLATELET # BLD AUTO: 390 X10*3/UL (ref 150–450)
POLYCHROMASIA BLD QL SMEAR: NORMAL
RBC # BLD AUTO: 5.86 X10*6/UL (ref 4.5–5.9)
RBC MORPH BLD: NORMAL
WBC # BLD AUTO: 9.7 X10*3/UL (ref 4.4–11.3)

## 2024-05-16 ENCOUNTER — TELEMEDICINE (OUTPATIENT)
Dept: HEMATOLOGY/ONCOLOGY | Facility: CLINIC | Age: 54
End: 2024-05-16
Payer: COMMERCIAL

## 2024-05-16 VITALS — RESPIRATION RATE: 18 BRPM

## 2024-05-16 DIAGNOSIS — Z51.11 ENCOUNTER FOR ANTINEOPLASTIC CHEMOTHERAPY: ICD-10-CM

## 2024-05-16 DIAGNOSIS — D50.0 IRON DEFICIENCY ANEMIA DUE TO CHRONIC BLOOD LOSS: ICD-10-CM

## 2024-05-16 DIAGNOSIS — C18.3 MALIGNANT NEOPLASM OF HEPATIC FLEXURE (MULTI): Primary | ICD-10-CM

## 2024-05-16 PROCEDURE — RXMED WILLOW AMBULATORY MEDICATION CHARGE

## 2024-05-16 PROCEDURE — 99214 OFFICE O/P EST MOD 30 MIN: CPT | Performed by: STUDENT IN AN ORGANIZED HEALTH CARE EDUCATION/TRAINING PROGRAM

## 2024-05-16 RX ORDER — PROCHLORPERAZINE EDISYLATE 5 MG/ML
10 INJECTION INTRAMUSCULAR; INTRAVENOUS EVERY 6 HOURS PRN
Status: CANCELLED | OUTPATIENT
Start: 2024-05-17

## 2024-05-16 RX ORDER — PROCHLORPERAZINE MALEATE 10 MG
10 TABLET ORAL EVERY 6 HOURS PRN
Status: CANCELLED | OUTPATIENT
Start: 2024-05-17

## 2024-05-16 RX ORDER — LORAZEPAM 2 MG/ML
1 INJECTION INTRAMUSCULAR AS NEEDED
Status: CANCELLED | OUTPATIENT
Start: 2024-05-17

## 2024-05-16 RX ORDER — EPINEPHRINE 0.3 MG/.3ML
0.3 INJECTION SUBCUTANEOUS EVERY 5 MIN PRN
Status: CANCELLED | OUTPATIENT
Start: 2024-05-17

## 2024-05-16 RX ORDER — PALONOSETRON 0.05 MG/ML
0.25 INJECTION, SOLUTION INTRAVENOUS ONCE
Status: CANCELLED | OUTPATIENT
Start: 2024-05-17

## 2024-05-16 RX ORDER — CAPECITABINE 500 MG/1
1000 TABLET, FILM COATED ORAL
Qty: 112 TABLET | Refills: 0 | Status: SHIPPED | OUTPATIENT
Start: 2024-05-16 | End: 2024-08-08

## 2024-05-16 RX ORDER — FAMOTIDINE 10 MG/ML
20 INJECTION INTRAVENOUS ONCE AS NEEDED
Status: CANCELLED | OUTPATIENT
Start: 2024-05-17

## 2024-05-16 RX ORDER — DIPHENHYDRAMINE HYDROCHLORIDE 50 MG/ML
50 INJECTION INTRAMUSCULAR; INTRAVENOUS AS NEEDED
Status: CANCELLED | OUTPATIENT
Start: 2024-05-17

## 2024-05-16 RX ORDER — ALBUTEROL SULFATE 0.83 MG/ML
3 SOLUTION RESPIRATORY (INHALATION) AS NEEDED
Status: CANCELLED | OUTPATIENT
Start: 2024-05-17

## 2024-05-16 ASSESSMENT — PAIN SCALES - GENERAL: PAINLEVEL: 0-NO PAIN

## 2024-05-17 ENCOUNTER — INFUSION (OUTPATIENT)
Dept: HEMATOLOGY/ONCOLOGY | Facility: CLINIC | Age: 54
End: 2024-05-17
Payer: COMMERCIAL

## 2024-05-17 VITALS
TEMPERATURE: 97.5 F | WEIGHT: 214.18 LBS | RESPIRATION RATE: 18 BRPM | BODY MASS INDEX: 30.59 KG/M2 | DIASTOLIC BLOOD PRESSURE: 80 MMHG | HEART RATE: 77 BPM | OXYGEN SATURATION: 96 % | SYSTOLIC BLOOD PRESSURE: 144 MMHG

## 2024-05-17 DIAGNOSIS — C18.3 MALIGNANT NEOPLASM OF HEPATIC FLEXURE (MULTI): ICD-10-CM

## 2024-05-17 PROCEDURE — 96375 TX/PRO/DX INJ NEW DRUG ADDON: CPT | Mod: INF

## 2024-05-17 PROCEDURE — 96415 CHEMO IV INFUSION ADDL HR: CPT

## 2024-05-17 PROCEDURE — 2500000004 HC RX 250 GENERAL PHARMACY W/ HCPCS (ALT 636 FOR OP/ED): Performed by: STUDENT IN AN ORGANIZED HEALTH CARE EDUCATION/TRAINING PROGRAM

## 2024-05-17 PROCEDURE — 96413 CHEMO IV INFUSION 1 HR: CPT

## 2024-05-17 RX ORDER — FAMOTIDINE 10 MG/ML
20 INJECTION INTRAVENOUS ONCE AS NEEDED
Status: DISCONTINUED | OUTPATIENT
Start: 2024-05-17 | End: 2024-05-20 | Stop reason: HOSPADM

## 2024-05-17 RX ORDER — PROCHLORPERAZINE EDISYLATE 5 MG/ML
10 INJECTION INTRAMUSCULAR; INTRAVENOUS EVERY 6 HOURS PRN
Status: DISCONTINUED | OUTPATIENT
Start: 2024-05-17 | End: 2024-05-20 | Stop reason: HOSPADM

## 2024-05-17 RX ORDER — DIPHENHYDRAMINE HYDROCHLORIDE 50 MG/ML
50 INJECTION INTRAMUSCULAR; INTRAVENOUS AS NEEDED
Status: DISCONTINUED | OUTPATIENT
Start: 2024-05-17 | End: 2024-05-20 | Stop reason: HOSPADM

## 2024-05-17 RX ORDER — HEPARIN SODIUM,PORCINE/PF 10 UNIT/ML
50 SYRINGE (ML) INTRAVENOUS AS NEEDED
OUTPATIENT
Start: 2024-05-17

## 2024-05-17 RX ORDER — HEPARIN 100 UNIT/ML
500 SYRINGE INTRAVENOUS AS NEEDED
Status: DISCONTINUED | OUTPATIENT
Start: 2024-05-17 | End: 2024-05-20 | Stop reason: HOSPADM

## 2024-05-17 RX ORDER — PALONOSETRON 0.05 MG/ML
0.25 INJECTION, SOLUTION INTRAVENOUS ONCE
Status: COMPLETED | OUTPATIENT
Start: 2024-05-17 | End: 2024-05-17

## 2024-05-17 RX ORDER — ALBUTEROL SULFATE 0.83 MG/ML
3 SOLUTION RESPIRATORY (INHALATION) AS NEEDED
Status: DISCONTINUED | OUTPATIENT
Start: 2024-05-17 | End: 2024-05-20 | Stop reason: HOSPADM

## 2024-05-17 RX ORDER — LORAZEPAM 2 MG/ML
1 INJECTION INTRAMUSCULAR AS NEEDED
Status: DISCONTINUED | OUTPATIENT
Start: 2024-05-17 | End: 2024-05-20 | Stop reason: HOSPADM

## 2024-05-17 RX ORDER — HEPARIN SODIUM,PORCINE/PF 10 UNIT/ML
50 SYRINGE (ML) INTRAVENOUS AS NEEDED
Status: DISCONTINUED | OUTPATIENT
Start: 2024-05-17 | End: 2024-05-20 | Stop reason: HOSPADM

## 2024-05-17 RX ORDER — HEPARIN 100 UNIT/ML
500 SYRINGE INTRAVENOUS AS NEEDED
OUTPATIENT
Start: 2024-05-17

## 2024-05-17 RX ORDER — EPINEPHRINE 0.3 MG/.3ML
0.3 INJECTION SUBCUTANEOUS EVERY 5 MIN PRN
Status: DISCONTINUED | OUTPATIENT
Start: 2024-05-17 | End: 2024-05-20 | Stop reason: HOSPADM

## 2024-05-17 RX ORDER — PROCHLORPERAZINE MALEATE 10 MG
10 TABLET ORAL EVERY 6 HOURS PRN
Status: DISCONTINUED | OUTPATIENT
Start: 2024-05-17 | End: 2024-05-20 | Stop reason: HOSPADM

## 2024-05-17 RX ADMIN — OXALIPLATIN 220 MG: 5 INJECTION, SOLUTION INTRAVENOUS at 14:46

## 2024-05-17 RX ADMIN — PALONOSETRON HYDROCHLORIDE 250 MCG: 0.25 INJECTION INTRAVENOUS at 14:25

## 2024-05-17 RX ADMIN — DEXAMETHASONE SODIUM PHOSPHATE 12 MG: 10 INJECTION, SOLUTION INTRAMUSCULAR; INTRAVENOUS at 14:25

## 2024-05-17 ASSESSMENT — PAIN SCALES - GENERAL: PAINLEVEL: 0-NO PAIN

## 2024-05-20 ENCOUNTER — PHARMACY VISIT (OUTPATIENT)
Dept: PHARMACY | Facility: CLINIC | Age: 54
End: 2024-05-20
Payer: COMMERCIAL

## 2024-05-20 ENCOUNTER — SPECIALTY PHARMACY (OUTPATIENT)
Dept: PHARMACY | Facility: CLINIC | Age: 54
End: 2024-05-20

## 2024-06-04 NOTE — PROGRESS NOTES
Cancer History:  DIAGNOSIS: Colon cancer, hepatic flexure    STAGING: pT3 pN1a Mx    CURRENT SITES OF DISEASE:     MOLECULAR/GENOMIC:  MMR-proficient    TUMOR MARKER:   CEA 1.7 1/2024    CURRENT TREATMENT:   3/15/24: C1 adjuvant CapeOx, After C2 CapeOx had debilitating tingling in hands and feet and felt exhausted. Will dose reduce oxaliplatin by 20% for C3 and C4  Feraheme IV x 2 doses    PRIOR TREATMENT:   1/29/24: S/p laparoscopic extended R hemicolectomy with Dr. Hernandez    HISTORY:   10/2023: Presented with iron deficiency anemia  12/18/23: EGD & C-scope showed polyps in transverse colon, mass in hepatic flexure. Biopsy hepatic flexure mass showed moderately differentiated adenocarcionma of the colon, MMR-intact  1/11/24: CT C/A/P showed near circumferential mass in hepatic flexure with surrounding subcm LNs, few prominent LNs in the caterina hepatis, wedge-shaped hypodensities throughout the spleen, component of prostate hypertrophy  1/29/24: S/p R hemicolectomy. Final pathology showed invasive adenocarcinoma Grade 2, margins negative, no LVI or PNI, 1/29 LNs involved, pT3 pN1a Mx      PMH: GERD, HLD, HTN    SH: No tobacco, EtOH, illicits    FH: No FH of malignancy      Subjective:   Tingling in hands and feet and fatigue were better after the last cycle     No fevers, chills, chest pain, shortness of breath, abdominal pain, nausea, vomiting, diarrhea, or rashes.    ROS as above. Remainder of 10-point review of systems elicited and negative.    Objective:      3/15/2024     3:04 PM 3/15/2024     5:26 PM 4/5/2024    12:24 PM 4/5/2024     4:00 PM 4/26/2024     2:08 PM 5/16/2024    10:54 AM 5/17/2024     2:04 PM   Vitals   Systolic 180 170 131 131 152  144   Diastolic 97 110 92 87 90  80   Heart Rate 90 81 112 96 118  77   Temp   36.3 °C (97.4 °F)  36.5 °C (97.7 °F)  36.4 °C (97.5 °F)   Resp   18  18 18 18   Weight (lb)   211.2  212.74  214.18   BMI   30.17 kg/m2  30.39 kg/m2  30.59 kg/m2   BSA (m2)   2.18 m2  2.19  m2  2.19 m2        PE:  Gen: A&O, NAD  Head: Normocephalic, atraumatic  Eyes: no scleral icterus  ENT: mucous membranes moist  Resp: no apparent respiratory distress  Neuro: CNII-XII grossly intact  Psych: appropriate mood & affect  Skin: warm, dry, no apparent rashes    ECOG Performance Status: 0      Assessment & Plan:   Eze Damian is a 53 y.o. male with Stage IIIA ascending colon cancer (hepatic flexure), MMR-proficient, diagnosed 12/18/23 after presenting with iron deficiency anemia. He underwent laparoscopic extended R hemicolectomy with Dr. Hernandez on 1/29/24, final pathology showed pT3 pN1a M0.     I discussed the benefit to adjuvant chemotherapy with FOLFOX vs CapeOx, 3mo vs 6mo, and he would like to proceed with CapeOx. I discussed the risks and benefits of chemotherapy at length, and he agrees to proceed.     4/4/24: Did great with C1 CapeOx. Noticed he still has microcytic anemia. He is still taking oral iron. Ordered iron studies which indicate he is still iron deficient. I ordered IV iron which will help boost his iron stores up while he is receiving chemotherapy since oral iron takes some time to take effect.     4/25/24 After C2, had debilitating tingling in fingertips and feet and was exhausted. Symptoms lasted 2 days. Will dose reduce Oxaliplatin by 20%.    5/16/24: With the dose reduction in the Oxaliplatin in C3, his tingling and fatigue improved. Continue dose reduction for his last cycle of chemo.    Plan:   Stage IIIA ascending colon cancer, MMR-profiicient  - Plan for 3mo (4 cycles) adjuvant chemotherapy with capecitabine (2000mg BID, days 1-14) and oxaliplatin 130mg/m2 day 1 every 21 day cycle  - C4 CapeOx 5/17/24, continue dose reduction of Oxaliplatin by 20% due to debilitating tingling in fingertips and feet and fatigue  - CT c/a/p 6/18  - RTC VV with Dr. Ruiz 6/20 to review scan and surveillance plan    Iron deficiency anemia  - Iron PO 3x/week  - Recheck iron levels in 2mo  5/2024

## 2024-06-06 ENCOUNTER — SPECIALTY PHARMACY (OUTPATIENT)
Dept: HEMATOLOGY/ONCOLOGY | Facility: HOSPITAL | Age: 54
End: 2024-06-06
Payer: COMMERCIAL

## 2024-06-06 DIAGNOSIS — C18.3 MALIGNANT NEOPLASM OF HEPATIC FLEXURE (MULTI): ICD-10-CM

## 2024-06-06 RX ORDER — CAPECITABINE 500 MG/1
1000 TABLET, FILM COATED ORAL
Qty: 112 TABLET | Refills: 0 | OUTPATIENT
Start: 2024-06-06 | End: 2024-08-29

## 2024-06-17 ENCOUNTER — LAB (OUTPATIENT)
Dept: LAB | Facility: LAB | Age: 54
End: 2024-06-17
Payer: COMMERCIAL

## 2024-06-17 DIAGNOSIS — C18.3 MALIGNANT NEOPLASM OF HEPATIC FLEXURE (MULTI): Primary | ICD-10-CM

## 2024-06-17 LAB
BUN SERPL-MCNC: 11 MG/DL (ref 6–23)
CREAT SERPL-MCNC: 0.83 MG/DL (ref 0.5–1.3)
EGFRCR SERPLBLD CKD-EPI 2021: >90 ML/MIN/1.73M*2

## 2024-06-17 PROCEDURE — 82565 ASSAY OF CREATININE: CPT

## 2024-06-17 PROCEDURE — 36415 COLL VENOUS BLD VENIPUNCTURE: CPT

## 2024-06-17 PROCEDURE — 84520 ASSAY OF UREA NITROGEN: CPT

## 2024-06-18 ENCOUNTER — HOSPITAL ENCOUNTER (OUTPATIENT)
Dept: RADIOLOGY | Facility: CLINIC | Age: 54
Discharge: HOME | End: 2024-06-18
Payer: COMMERCIAL

## 2024-06-18 DIAGNOSIS — C18.3 MALIGNANT NEOPLASM OF HEPATIC FLEXURE (MULTI): ICD-10-CM

## 2024-06-18 PROCEDURE — 74177 CT ABD & PELVIS W/CONTRAST: CPT

## 2024-06-18 PROCEDURE — 74177 CT ABD & PELVIS W/CONTRAST: CPT | Performed by: RADIOLOGY

## 2024-06-18 PROCEDURE — 2550000001 HC RX 255 CONTRASTS: Performed by: STUDENT IN AN ORGANIZED HEALTH CARE EDUCATION/TRAINING PROGRAM

## 2024-06-18 PROCEDURE — 71260 CT THORAX DX C+: CPT | Performed by: RADIOLOGY

## 2024-06-20 ENCOUNTER — TELEMEDICINE (OUTPATIENT)
Dept: HEMATOLOGY/ONCOLOGY | Facility: HOSPITAL | Age: 54
End: 2024-06-20
Payer: COMMERCIAL

## 2024-06-20 DIAGNOSIS — C18.3 MALIGNANT NEOPLASM OF HEPATIC FLEXURE (MULTI): ICD-10-CM

## 2024-06-20 PROCEDURE — 99215 OFFICE O/P EST HI 40 MIN: CPT | Performed by: STUDENT IN AN ORGANIZED HEALTH CARE EDUCATION/TRAINING PROGRAM

## 2024-06-20 NOTE — PROGRESS NOTES
Cancer History:  DIAGNOSIS: Colon cancer, hepatic flexure    STAGING: pT3 pN1a Mx    CURRENT SITES OF DISEASE:     MOLECULAR/GENOMIC:  MMR-proficient    TUMOR MARKER:   CEA 1.7 1/2024    CURRENT TREATMENT:       PRIOR TREATMENT:   1/29/24: S/p laparoscopic extended R hemicolectomy with Dr. Hernandez  3/15/24--5/17/24: S/p 3mo adjuvant capeOx, After C2 CapeOx had debilitating tingling in hands and feet and felt exhausted. Will dose reduce oxaliplatin by 20% for C3 and C4  Feraheme IV x 2 doses    HISTORY:   10/2023: Presented with iron deficiency anemia  12/18/23: EGD & C-scope showed polyps in transverse colon, mass in hepatic flexure. Biopsy hepatic flexure mass showed moderately differentiated adenocarcionma of the colon, MMR-intact  1/11/24: CT C/A/P showed near circumferential mass in hepatic flexure with surrounding subcm LNs, few prominent LNs in the caterina hepatis, wedge-shaped hypodensities throughout the spleen, component of prostate hypertrophy  1/29/24: S/p R hemicolectomy. Final pathology showed invasive adenocarcinoma Grade 2, margins negative, no LVI or PNI, 1/29 LNs involved, pT3 pN1a Mx    PMH: GERD, HLD, HTN    SH: No tobacco, EtOH, illicits    FH: No FH of malignancy      Subjective:   Feeling excellent off chemotherapy, he is biking and exercising regularly.    No fevers, chills, chest pain, shortness of breath, abdominal pain, nausea, vomiting, diarrhea, or rashes.    ROS as above. Remainder of 10-point review of systems elicited and negative.    Objective:  There were no vitals taken for this visit.     PE:  Gen: A&O, NAD  Head: Normocephalic, atraumatic  Eyes: no scleral icterus  ENT: mucous membranes moist  Resp: no apparent respiratory distress  Neuro: CNII-XII grossly intact  Psych: appropriate mood & affect  Skin: warm, dry, no apparent rashes    ECOG Performance Status: 0    CT C/A/P 6/18/24:   1. Status post right hemicolectomy.  2. No metastatic disease in the chest, abdomen and  pelvis.  3. Hepatomegaly with diffuse severe hepatic steatosis with areas of  fat sparing. Correlate with liver function tests.  4. Interval decrease in size of the splenic infarct.  5. Bilateral simple renal cysts.       Assessment & Plan:   Eze Damian is a 53 y.o. male with Stage IIIA ascending colon cancer (hepatic flexure), MMR-proficient, diagnosed 12/18/23 after presenting with iron deficiency anemia. He underwent laparoscopic extended R hemicolectomy with Dr. Hernandez on 1/29/24, final pathology showed pT3 pN1a M0.     I discussed the benefit to adjuvant chemotherapy with FOLFOX vs CapeOx, 3mo vs 6mo, and he would like to proceed with CapeOx. I discussed the risks and benefits of chemotherapy at length, and he agrees to proceed.     4/4/24: Did great with C1 CapeOx. Noticed he still has microcytic anemia. He is still taking oral iron. Ordered iron studies which indicate he is still iron deficient. I ordered IV iron which will help boost his iron stores up while he is receiving chemotherapy since oral iron takes some time to take effect.     4/25/24 After C2, had debilitating tingling in fingertips and feet and was exhausted. Symptoms lasted 2 days. Will dose reduce Oxaliplatin by 20%.    5/16/24: With the dose reduction in the Oxaliplatin in C3, his tingling and fatigue improved. Continue dose reduction for his last cycle of chemo.    6/20/24: I personally reviewed the images from the recent CT, which show no evidence of disease. I did discuss adopting a low-fat diet given the hepatic steatosis seen on CT, and will re-assess with future CT. If persistent/worsening, then will refer to hepatology. CEA is stable and normal at 2.5. Signatera is negative 6/2024. Will proceed with surveillance.    Plan:   Stage IIIA ascending colon cancer, MMR-profiicient  - Completed 3mo (4 cycles) adjuvant chemotherapy with capecitabine (2000mg BID, days 1-14) and oxaliplatin 130mg/m2 day 1 every 21 day cycle  - C4 CapeOx  5/17/24, dose reduction of Oxaliplatin by 20% due to debilitating tingling in fingertips and feet and fatigue  - RTC 3mo with CBC, CMP, CEA, and Signatera; plan for repeat CT in 6mo    Iron deficiency anemia  - Iron PO 3x/week  - Recheck iron levels in 2mo 5/2024

## 2024-08-23 DIAGNOSIS — I10 PRIMARY HYPERTENSION: ICD-10-CM

## 2024-08-23 RX ORDER — TRIAMTERENE AND HYDROCHLOROTHIAZIDE 75; 50 MG/1; MG/1
1 TABLET ORAL DAILY
Qty: 90 TABLET | Refills: 0 | Status: SHIPPED | OUTPATIENT
Start: 2024-08-23

## 2024-08-23 NOTE — TELEPHONE ENCOUNTER
Refill request received for Triamterene-hydrochlorothiazide 75-50mg.  Preferred pharmacy is [facility] [place].  Medication pended to team to refill, if appropriate,

## 2024-09-19 ENCOUNTER — TELEMEDICINE (OUTPATIENT)
Dept: HEMATOLOGY/ONCOLOGY | Facility: CLINIC | Age: 54
End: 2024-09-19
Payer: COMMERCIAL

## 2024-09-19 DIAGNOSIS — Z08 ENCOUNTER FOR FOLLOW-UP SURVEILLANCE OF COLON CANCER: ICD-10-CM

## 2024-09-19 DIAGNOSIS — C18.9 MALIGNANT NEOPLASM OF COLON, UNSPECIFIED PART OF COLON (MULTI): Primary | ICD-10-CM

## 2024-09-19 DIAGNOSIS — C18.3 MALIGNANT NEOPLASM OF HEPATIC FLEXURE (MULTI): ICD-10-CM

## 2024-09-19 DIAGNOSIS — Z85.038 ENCOUNTER FOR FOLLOW-UP SURVEILLANCE OF COLON CANCER: ICD-10-CM

## 2024-09-19 PROBLEM — Z51.11 ENCOUNTER FOR ANTINEOPLASTIC CHEMOTHERAPY: Status: RESOLVED | Noted: 2024-04-05 | Resolved: 2024-09-19

## 2024-09-19 PROCEDURE — 99214 OFFICE O/P EST MOD 30 MIN: CPT | Performed by: STUDENT IN AN ORGANIZED HEALTH CARE EDUCATION/TRAINING PROGRAM

## 2024-09-19 PROCEDURE — 1036F TOBACCO NON-USER: CPT | Performed by: STUDENT IN AN ORGANIZED HEALTH CARE EDUCATION/TRAINING PROGRAM

## 2024-09-19 NOTE — PROGRESS NOTES
Cancer History:  DIAGNOSIS: Colon cancer, hepatic flexure    STAGING: pT3 pN1a Mx    CURRENT SITES OF DISEASE:     MOLECULAR/GENOMIC:  MMR-proficient    ctDNA Signatera  2/29/24 not detected (0)  5/17/24 not detected (0)  9/4/24 not detected (0)    TUMOR MARKER:   CEA 1.7 1/2024  CEA 2.5 5/13/24    CURRENT TREATMENT:   Surveillance    PRIOR TREATMENT:   1/29/24: S/p laparoscopic extended R hemicolectomy with Dr. Hernandez  3/15/24--5/17/24: S/p 3mo adjuvant capeOx, After C2 CapeOx had debilitating tingling in hands and feet and felt exhausted. Will dose reduce oxaliplatin by 20% for C3 and C4  Feraheme IV x 2 doses    HISTORY:   10/2023: Presented with iron deficiency anemia  12/18/23: EGD & C-scope showed polyps in transverse colon, mass in hepatic flexure. Biopsy hepatic flexure mass showed moderately differentiated adenocarcionma of the colon, MMR-intact  1/11/24: CT C/A/P showed near circumferential mass in hepatic flexure with surrounding subcm LNs, few prominent LNs in the caterina hepatis, wedge-shaped hypodensities throughout the spleen, component of prostate hypertrophy  1/29/24: S/p R hemicolectomy. Final pathology showed invasive adenocarcinoma Grade 2, margins negative, no LVI or PNI, 1/29 LNs involved, pT3 pN1a Mx    PMH: GERD, HLD, HTN    SH: No tobacco, EtOH, illicits    FH: No FH of malignancy      Subjective:   Feeling excellent off chemotherapy, he is biking and exercising regularly.    No fevers, chills, chest pain, shortness of breath, abdominal pain, nausea, vomiting, diarrhea, or rashes.    ROS as above. Remainder of 10-point review of systems elicited and negative.    Objective:  There were no vitals taken for this visit.     PE:  Gen: A&O, NAD  Head: Normocephalic, atraumatic  Eyes: no scleral icterus  ENT: mucous membranes moist  Resp: no apparent respiratory distress  Neuro: CNII-XII grossly intact  Psych: appropriate mood & affect  Skin: warm, dry, no apparent rashes    ECOG Performance  Status: 0    CT C/A/P 6/18/24:   1. Status post right hemicolectomy.  2. No metastatic disease in the chest, abdomen and pelvis.  3. Hepatomegaly with diffuse severe hepatic steatosis with areas of  fat sparing. Correlate with liver function tests.  4. Interval decrease in size of the splenic infarct.  5. Bilateral simple renal cysts.       Assessment & Plan:   Eze Damian is a 53 y.o. male with Stage IIIA ascending colon cancer (hepatic flexure), MMR-proficient, diagnosed 12/18/23 after presenting with iron deficiency anemia. He underwent laparoscopic extended R hemicolectomy with Dr. Hernandez on 1/29/24, final pathology showed pT3 pN1a M0.     I discussed the benefit to adjuvant chemotherapy with FOLFOX vs CapeOx, 3mo vs 6mo, and he would like to proceed with CapeOx. I discussed the risks and benefits of chemotherapy at length, and he agrees to proceed.     4/4/24: Did great with C1 CapeOx. Noticed he still has microcytic anemia. He is still taking oral iron. Ordered iron studies which indicate he is still iron deficient. I ordered IV iron which will help boost his iron stores up while he is receiving chemotherapy since oral iron takes some time to take effect.     4/25/24 After C2, had debilitating tingling in fingertips and feet and was exhausted. Symptoms lasted 2 days. Will dose reduce Oxaliplatin by 20%.    5/16/24: With the dose reduction in the Oxaliplatin in C3, his tingling and fatigue improved. Continue dose reduction for his last cycle of chemo.    6/20/24: I personally reviewed the images from the recent CT, which show no evidence of disease. I did discuss adopting a low-fat diet given the hepatic steatosis seen on CT, and will re-assess with future CT. If persistent/worsening, then will refer to hepatology. CEA is stable and normal at 2.5. Signatera is negative 6/2024. Will proceed with surveillance.    Plan:   Stage IIIA ascending colon cancer, MMR-profiicient  - Completed 3mo (4 cycles) adjuvant  chemotherapy with capecitabine (2000mg BID, days 1-14) and oxaliplatin 130mg/m2 day 1 every 21 day cycle, Last cycle C4 completed on 5/17/24; dose reduction of Oxaliplatin by 20% due to debilitating tingling in fingertips and feet and fatigue  - RTC 3mo with CBC, CMP, CEA, and Signatera (mobile phlebotomy); plan for repeat CT in 3mo

## 2024-12-17 ENCOUNTER — TELEPHONE (OUTPATIENT)
Dept: HEMATOLOGY/ONCOLOGY | Facility: CLINIC | Age: 54
End: 2024-12-17
Payer: COMMERCIAL

## 2024-12-17 ENCOUNTER — APPOINTMENT (OUTPATIENT)
Dept: RADIOLOGY | Facility: HOSPITAL | Age: 54
End: 2024-12-17
Payer: COMMERCIAL

## 2024-12-17 ENCOUNTER — HOSPITAL ENCOUNTER (OUTPATIENT)
Dept: RADIOLOGY | Facility: CLINIC | Age: 54
Discharge: HOME | End: 2024-12-17
Payer: COMMERCIAL

## 2024-12-17 ENCOUNTER — LAB (OUTPATIENT)
Dept: LAB | Facility: LAB | Age: 54
End: 2024-12-17
Payer: COMMERCIAL

## 2024-12-17 DIAGNOSIS — C18.9 MALIGNANT NEOPLASM OF COLON, UNSPECIFIED PART OF COLON (MULTI): ICD-10-CM

## 2024-12-17 LAB
ALBUMIN SERPL BCP-MCNC: 4.5 G/DL (ref 3.4–5)
ALP SERPL-CCNC: 146 U/L (ref 33–120)
ALT SERPL W P-5'-P-CCNC: 47 U/L (ref 10–52)
ANION GAP SERPL CALC-SCNC: 13 MMOL/L (ref 10–20)
AST SERPL W P-5'-P-CCNC: 35 U/L (ref 9–39)
BASOPHILS # BLD AUTO: 0.06 X10*3/UL (ref 0–0.1)
BASOPHILS NFR BLD AUTO: 0.5 %
BILIRUB SERPL-MCNC: 0.6 MG/DL (ref 0–1.2)
BUN SERPL-MCNC: 11 MG/DL (ref 6–23)
CALCIUM SERPL-MCNC: 9.3 MG/DL (ref 8.6–10.3)
CEA SERPL-MCNC: 2 UG/L
CHLORIDE SERPL-SCNC: 99 MMOL/L (ref 98–107)
CO2 SERPL-SCNC: 29 MMOL/L (ref 21–32)
CREAT SERPL-MCNC: 0.91 MG/DL (ref 0.5–1.3)
EGFRCR SERPLBLD CKD-EPI 2021: >90 ML/MIN/1.73M*2
EOSINOPHIL # BLD AUTO: 0.18 X10*3/UL (ref 0–0.7)
EOSINOPHIL NFR BLD AUTO: 1.5 %
ERYTHROCYTE [DISTWIDTH] IN BLOOD BY AUTOMATED COUNT: 14.5 % (ref 11.5–14.5)
GLUCOSE SERPL-MCNC: 109 MG/DL (ref 74–99)
HCT VFR BLD AUTO: 49.7 % (ref 41–52)
HGB BLD-MCNC: 15.7 G/DL (ref 13.5–17.5)
IMM GRANULOCYTES # BLD AUTO: 0.04 X10*3/UL (ref 0–0.7)
IMM GRANULOCYTES NFR BLD AUTO: 0.3 % (ref 0–0.9)
LYMPHOCYTES # BLD AUTO: 2.22 X10*3/UL (ref 1.2–4.8)
LYMPHOCYTES NFR BLD AUTO: 18.2 %
MCH RBC QN AUTO: 26.9 PG (ref 26–34)
MCHC RBC AUTO-ENTMCNC: 31.6 G/DL (ref 32–36)
MCV RBC AUTO: 85 FL (ref 80–100)
MONOCYTES # BLD AUTO: 0.92 X10*3/UL (ref 0.1–1)
MONOCYTES NFR BLD AUTO: 7.5 %
NEUTROPHILS # BLD AUTO: 8.8 X10*3/UL (ref 1.2–7.7)
NEUTROPHILS NFR BLD AUTO: 72 %
NRBC BLD-RTO: 0 /100 WBCS (ref 0–0)
PLATELET # BLD AUTO: 377 X10*3/UL (ref 150–450)
POTASSIUM SERPL-SCNC: 3.8 MMOL/L (ref 3.5–5.3)
PROT SERPL-MCNC: 7.7 G/DL (ref 6.4–8.2)
RBC # BLD AUTO: 5.84 X10*6/UL (ref 4.5–5.9)
SODIUM SERPL-SCNC: 137 MMOL/L (ref 136–145)
WBC # BLD AUTO: 12.2 X10*3/UL (ref 4.4–11.3)

## 2024-12-17 PROCEDURE — 80053 COMPREHEN METABOLIC PANEL: CPT

## 2024-12-17 PROCEDURE — 71260 CT THORAX DX C+: CPT

## 2024-12-17 PROCEDURE — 82378 CARCINOEMBRYONIC ANTIGEN: CPT

## 2024-12-17 PROCEDURE — 2550000001 HC RX 255 CONTRASTS: Performed by: STUDENT IN AN ORGANIZED HEALTH CARE EDUCATION/TRAINING PROGRAM

## 2024-12-17 PROCEDURE — 71260 CT THORAX DX C+: CPT | Performed by: RADIOLOGY

## 2024-12-17 PROCEDURE — 85025 COMPLETE CBC W/AUTO DIFF WBC: CPT

## 2024-12-17 PROCEDURE — 84153 ASSAY OF PSA TOTAL: CPT

## 2024-12-17 PROCEDURE — 36415 COLL VENOUS BLD VENIPUNCTURE: CPT

## 2024-12-17 PROCEDURE — 74177 CT ABD & PELVIS W/CONTRAST: CPT | Performed by: RADIOLOGY

## 2024-12-17 NOTE — TELEPHONE ENCOUNTER
Left voicemail, asked patient to return my call. Was calling patient to remind that he is due for labs prior to his MD appt on 12/19.

## 2024-12-19 ENCOUNTER — APPOINTMENT (OUTPATIENT)
Dept: HEMATOLOGY/ONCOLOGY | Facility: CLINIC | Age: 54
End: 2024-12-19
Payer: COMMERCIAL

## 2024-12-19 ENCOUNTER — TELEMEDICINE (OUTPATIENT)
Dept: HEMATOLOGY/ONCOLOGY | Facility: CLINIC | Age: 54
End: 2024-12-19
Payer: COMMERCIAL

## 2024-12-19 DIAGNOSIS — C18.9 MALIGNANT NEOPLASM OF COLON, UNSPECIFIED PART OF COLON (MULTI): ICD-10-CM

## 2024-12-19 LAB — PSA SERPL-MCNC: 1.39 NG/ML

## 2024-12-19 PROCEDURE — 99215 OFFICE O/P EST HI 40 MIN: CPT | Performed by: STUDENT IN AN ORGANIZED HEALTH CARE EDUCATION/TRAINING PROGRAM

## 2024-12-19 PROCEDURE — G2211 COMPLEX E/M VISIT ADD ON: HCPCS | Performed by: STUDENT IN AN ORGANIZED HEALTH CARE EDUCATION/TRAINING PROGRAM

## 2024-12-19 NOTE — PROGRESS NOTES
Cancer History:  DIAGNOSIS: Colon cancer, hepatic flexure    STAGING: pT3 pN1a Mx    CURRENT SITES OF DISEASE:     MOLECULAR/GENOMIC:  MMR-proficient    ctDNA Signatera  2/29/24 not detected (0)  5/17/24 not detected (0)  9/4/24 not detected (0)    TUMOR MARKER:   CEA 1.7 1/2024  CEA 2.5 5/13/24    CURRENT TREATMENT:   Surveillance    PRIOR TREATMENT:   1/29/24: S/p laparoscopic extended R hemicolectomy with Dr. Hernandez  3/15/24--5/17/24: S/p 3mo adjuvant capeOx, After C2 CapeOx had debilitating tingling in hands and feet and felt exhausted. Will dose reduce oxaliplatin by 20% for C3 and C4  Feraheme IV x 2 doses    HISTORY:   10/2023: Presented with iron deficiency anemia  12/18/23: EGD & C-scope showed polyps in transverse colon, mass in hepatic flexure. Biopsy hepatic flexure mass showed moderately differentiated adenocarcionma of the colon, MMR-intact  1/11/24: CT C/A/P showed near circumferential mass in hepatic flexure with surrounding subcm LNs, few prominent LNs in the caterina hepatis, wedge-shaped hypodensities throughout the spleen, component of prostate hypertrophy  1/29/24: S/p R hemicolectomy. Final pathology showed invasive adenocarcinoma Grade 2, margins negative, no LVI or PNI, 1/29 LNs involved, pT3 pN1a Mx    PMH: GERD, HLD, HTN    SH: No tobacco, EtOH, illicits    FH: No FH of malignancy      Subjective:   Feeling excellent off chemotherapy. Still golfing, playing basketball, biking, all the energy.     No fevers, chills, chest pain, shortness of breath, abdominal pain, nausea, vomiting, diarrhea, or rashes.    ROS as above. Remainder of 10-point review of systems elicited and negative.    Objective:  There were no vitals taken for this visit.     PE:  Deferred d/t telephone encounter    ECOG Performance Status: 0    CT C/A/P 6/18/24:   1. Status post right hemicolectomy.  2. No metastatic disease in the chest, abdomen and pelvis.  3. Hepatomegaly with diffuse severe hepatic steatosis with areas  of  fat sparing. Correlate with liver function tests.  4. Interval decrease in size of the splenic infarct.  5. Bilateral simple renal cysts.     CT C/A/P 12/17/24:  1. No evidence of metastatic disease within the chest.  2. Postsurgical changes from right hemicolectomy without evidence of  locoregional recurrence or metastatic disease within the abdomen or  pelvis.  3. Hepatomegaly with findings suggestive of steatosis.  4. Prostatomegaly. Correlate clinically with PSA levels.  5. Additional findings as described above.        Assessment & Plan:   Eze Damian is a 54 y.o. male with Stage IIIA ascending colon cancer (hepatic flexure), MMR-proficient, diagnosed 12/18/23 after presenting with iron deficiency anemia. He underwent laparoscopic extended R hemicolectomy with Dr. Hernandez on 1/29/24, final pathology showed pT3 pN1a M0.     I discussed the benefit to adjuvant chemotherapy with FOLFOX vs CapeOx, 3mo vs 6mo, and he would like to proceed with CapeOx. I discussed the risks and benefits of chemotherapy at length, and he agrees to proceed.     4/4/24: Did great with C1 CapeOx. Noticed he still has microcytic anemia. He is still taking oral iron. Ordered iron studies which indicate he is still iron deficient. I ordered IV iron which will help boost his iron stores up while he is receiving chemotherapy since oral iron takes some time to take effect.     4/25/24 After C2, had debilitating tingling in fingertips and feet and was exhausted. Symptoms lasted 2 days. Will dose reduce Oxaliplatin by 20%.    5/16/24: With the dose reduction in the Oxaliplatin in C3, his tingling and fatigue improved. Continue dose reduction for his last cycle of chemo.    6/20/24: I personally reviewed the images from the recent CT, which show no evidence of disease. I did discuss adopting a low-fat diet given the hepatic steatosis seen on CT, and will re-assess with future CT. If persistent/worsening, then will refer to hepatology. CEA  is stable and normal at 2.5. Signatera is negative 6/2024. Will proceed with surveillance.    12/19/24: I personally reviewed the images from the recent CT, which show no evidence of disease. Note is made of enlarged prostate so will add PSA on to yesterday's labs. CEA is normal and Signatera is negative. RTC 3mo with labs.     Plan:   Stage IIIA ascending colon cancer, MMR-profiicient  - Completed 3mo (4 cycles) adjuvant chemotherapy with capecitabine (2000mg BID, days 1-14) and oxaliplatin 130mg/m2 day 1 every 21 day cycle, Last cycle C4 completed on 5/17/24; dose reduction of Oxaliplatin by 20% due to debilitating tingling in fingertips and feet and fatigue  - RTC 3mo with CBC, CMP, CEA, and Signatera (mobile phlebotomy); plan for repeat CT in 6mo

## 2025-03-12 ENCOUNTER — TELEPHONE (OUTPATIENT)
Dept: HEMATOLOGY/ONCOLOGY | Facility: HOSPITAL | Age: 55
End: 2025-03-12
Payer: COMMERCIAL

## 2025-03-12 NOTE — TELEPHONE ENCOUNTER
Called patient regarding follow up visit that needs scheduled with Lacey Ewing this month. The patient did not answer. I left him a brief voicemail with instructions for call back so that we can get him scheduled to see a new provider since Dr. Ruiz has left.

## 2025-04-02 DIAGNOSIS — C18.3 MALIGNANT NEOPLASM OF HEPATIC FLEXURE (MULTI): Primary | ICD-10-CM

## (undated) DEVICE — SUTURE, PDS II, 0, 36 IN, CT, VIOLET

## (undated) DEVICE — GLOVE, SURGICAL, PROTEXIS PI BLUE W/NEUTHERA, 6.5, PF, LF

## (undated) DEVICE — GOWN, SURGICAL, SMARTGOWN, LARGE, STERILE

## (undated) DEVICE — SEAL, SCOPE WARMER DISPOSABLE

## (undated) DEVICE — NERVE BLOCK, STIMUQUIK, SINGLE-SHOT, 21GA X 3-1/2

## (undated) DEVICE — Device

## (undated) DEVICE — SUTURE, VICRYL, 3-0, 27 IN, SH

## (undated) DEVICE — SLEEVE, SCD EXPRESS, KNEE LENGTH-MEDIUM

## (undated) DEVICE — COUNTER, NEEDLE, FOAM BLOCK, POP-N-COUNT, W/BLADEGUARD, W/ADHESIVE 40 COUNT, RED

## (undated) DEVICE — LIGASURE, V SEALER/DIVIDER  5MM BLUNT TIP

## (undated) DEVICE — SYRINGE, 10 CC, LUER LOCK

## (undated) DEVICE — TUBE SET, PNEUMOCLEAR, SMOKE EVACU, HIGH-FLOW

## (undated) DEVICE — DRAPE, LEGGINGS, 28.5 X 43 IN, DISPOSABLE, LF, STERILE

## (undated) DEVICE — SUTURE, MONOCRYL, 4-0, 18 IN, PS2, UNDYED

## (undated) DEVICE — SUTURE, VICRYL, 2-0, 18 IN, UNDYED

## (undated) DEVICE — SUTURE, PROLENE, 0, 30 IN, CT, BLUE

## (undated) DEVICE — KIT, GUIDEWIRE BUCKET, W/LID, F/FETAL REMAINS

## (undated) DEVICE — SOLUTION, IRRIGATION, SODIUM CHLORIDE 0.9%, 1000 ML, POUR BOTTLE

## (undated) DEVICE — STAPLER, LINEAR, 3.5 60MM, RELOADABLE, BLUE

## (undated) DEVICE — PROTECTOR, NERVE, ULNAR, PINK

## (undated) DEVICE — DRAPE, SHEET, UTILITY, NON ABSORBENT, 18 X 26 IN, LF

## (undated) DEVICE — CAUTERY, PENCIL, PUSH BUTTON, SMOKE EVAC, 70MM

## (undated) DEVICE — TOWEL, SURGICAL, NEURO, O/R, 16 X 26, BLUE, STERILE

## (undated) DEVICE — DEVICE, GELPOINT, SINGLE PORT

## (undated) DEVICE — WATER STERILE, FOR IRRIGATION, 1000ML, W/HANGER

## (undated) DEVICE — SUTURE, VICRYL, 0, 18 IN, TIE, VIOLET

## (undated) DEVICE — POSITIONING KIT, PINK PAD XL, ADVANCED TRENDELENBURG

## (undated) DEVICE — SUTURE, PROLENE, 0, 30 IN, SH

## (undated) DEVICE — GOWN, SURGICAL, SMARTGOWN, XLARGE, STERILE

## (undated) DEVICE — TUBING, SUCTION, NON-CONDUCTIVE, W/CONNECT,.25 IN X 12 FT, STERILE, LF

## (undated) DEVICE — STAPLER, ECHELON CIRCULAR POWERED, 31MM, DISP

## (undated) DEVICE — SYRINGE, 60 CC, IRRIGATION, BULB, CONTRO-BULB, PAPER POUCH